# Patient Record
Sex: FEMALE | Race: AMERICAN INDIAN OR ALASKA NATIVE
[De-identification: names, ages, dates, MRNs, and addresses within clinical notes are randomized per-mention and may not be internally consistent; named-entity substitution may affect disease eponyms.]

---

## 2018-06-19 NOTE — ULTRASOUND REPORT
FINAL REPORT



EXAM:  US OB FETAL BPP WO NON-STRESS



HISTORY:  EFW, Position 



TECHNIQUE:  Ultrasound examination of the gravid uterus for

biophysical profile evaluation of the fetus 



PRIORS:  Ob ultrasound 6/19/2018



FINDINGS:  

There is a single viable intrauterine pregnancy with documented

cardiac activity. The amniotic fluid volume is normal.



Fetal heart rate:  163 bpm



Amniotic fluid maximum vertical pocket: 4.8 cm



Evaluation for biophysical profile yields the following score as

reported by technologist from real-time exam:



Fetal respiratory motion (minimum one episode):   2 



Gross body movement (minimum 3 movements):  2 



Fetal tone (minimum one flexion and extension):  2 



Amniotic fluid volume (at least 2 cm pocket in vertical

diameter): 2 



IMPRESSION:  

Single viable intrauterine pregnancy with 8/8 biophysical profile

score during the sonographic evaluation

## 2018-06-19 NOTE — PROCEDURE NOTE
OB Delivery Note





- Delivery


Date of Delivery: 18


Surgeon: NIKOLAI SMITH


Estimated blood loss: 200cc





- Vaginal


Delivery presentation: vertex


Delivery position: OA


Intrapartum events:  labor-<37 weeks, precipitous labor- <3hr


Delivery induction: none


Delivery augmentation: rupture of membranes


Delivery monitor: external FHT, external uterine


Route of delivery: 


Delivery placenta: spontaneous


Delivery cord: 3 umbilical vessels


Episiotomy: none


Delivery laceration: none


Anesthesia: none


Delivery comments: 





Infant delivered OA and handed to awaiting Peds/RT in attendance





- Infant


  ** A


Apgar at 1 minute: 4


Apgar at 5 minutes: 8


Infant Gender: Male (1730gms)

## 2018-06-19 NOTE — ULTRASOUND REPORT
FINAL REPORT



EXAM:  US OB &gt; = 14 WEEKS FETUS



HISTORY:  fetal anatomy, position, EFW 



TECHNIQUE:   Ultrasound evaluation of the gravid uterus 



PRIORS:  Biophysical profile 6/19/2018



FINDINGS:  

There is a single viable intrauterine pregnancy with documented

cardiac activity.  Multiple ultrasound measurements are made to

determine a composite gestational age. Fetal ratios are within

normal limits. 



There is no evidence of placenta previa or abruption.  The

maternal cervix appears open and/or is obscured by fetal head.

The quantity of visualized amniotic fluid appears grossly normal.

 No sonographic abnormality in the visualized portion of the

fetal anatomy.



Heart rate:  163 beats per minute



Fetal position:  Cephalic



Placental position:  Left lateral, grade 1



Amniotic fluid index:  16.3cm



Estimated fetal weight: 1748 g



Growth percentile by ultrasound:  65



Ultrasound estimated gestational age: 31 weeks 1 day



Ultrasound estimated delivery date: 8/20/2018



LMP estimated gestational age: 30 weeks 4 days



LMP estimated delivery date: 8/24/2018



IMPRESSION:  

Single viable intrauterine pregnancy with the above parameters



The maternal cervix appears open and/or is obscured by the fetal

head

## 2018-06-19 NOTE — HISTORY AND PHYSICAL REPORT
History of Present Illness


Date of examination: 18


Chief complaint: 





 labor


History of present illness: 





Pt is a 24yo BF  EDC 18;  EGA 31 3/7 weeks presents to L&D complaining 

of RUC's q 2-3 mins.  She received prenatal care at [facility] 

since 13 weeks and  course significant for previous C section and 

untreated UTI from last week - Pt did not fill the prescription. She is 

currently dilated 6cm with a BBOW, and therefore will be admitted for 

aggressive attempt at tocolysis and administration of steroids.  Prenatal 

records are available.





Past History


Past Medical History: no pertinent history


Past Surgical History:  section


Social history: no significant social history, single





- Obstetrical History


Expected Date of Delivery: 18


Actual Gestation: 31 Week(s) 3 Day(s) 


: 2





Medications and Allergies


 Allergies











Allergy/AdvReac Type Severity Reaction Status Date / Time


 


No Known Allergies Allergy   Unverified 18 10:28











 Home Medications











 Medication  Instructions  Recorded  Confirmed  Last Taken  Type


 


Ferrous Sulfate [Feosol 325 MG tab] 1 tab PO QDAY 18 19:

00 History


 


Pnv No.95/Ferrous Fum/Folic AC 1 tab PO QDAY 18 19:00 

History





[Prenatal Vitamins Tablet]     


 


Nitrofurantoin Macrocrystal 100 mg PO BID #14 capsule 06/15/18 06/19/18 Unknown 

Rx





[Macrodantin]     











Active Meds: 


Active Medications





Betamethasone Acet/Betameth SodPhos (Celestone Soluspan)  12 mg IM Q12H JULIA


Lactated Ringer's (Lactated Ringers)  1,000 mls @ 125 mls/hr IV AS DIRECT JULIA


   Last Admin: 18 15:20 Dose:  125 mls/hr


Cefazolin Sodium (Ancef/Sterile Water 2 Gm/20 Ml)  2 gm in 20 mls @ 120 mls/hr 

IV ONCE NR


   Stop: 18 18:00


Ampicillin Sodium (Polycillin/Ns 2 Gm/100 Ml)  2 gm in 100 mls @ 100 mls/hr IV 

ONCE ONE; Protocol


   Stop: 18 16:59


Magnesium Sulfate (Magnesium Sulfate 40gm/1000ml)  40 gm in 1,000 mls @ 50 mls/

hr IV AS DIRECT JULIA


Magnesium Sulfate (Magnesium Sulfate 4gm/100ml)  4 gm in 100 mls @ 300 mls/hr 

IV ONCE ONE


   Stop: 18 15:51


Terbutaline Sulfate (Brethine)  0.25 mg SUB-Q ONCE NR


   Stop: 18 19:00


   Last Admin: 18 15:12 Dose:  0.25 mg


Terbutaline Sulfate (Brethine)  0.25 mg SUB-Q ONCE PRN


   PRN Reason: Hyperstimulation/Hypertonicity











Review of Systems


All systems: negative





- Vital Signs


Vital signs: 


 Vital Signs











Pulse BP Pulse Ox


 


 111 H  110/56   98 


 


 18 12:56  18 12:56  18 12:56








 











Temp Pulse Resp BP Pulse Ox


 


 98.0 F   98 H  16   110/56   100 


 


 18 13:23  18 15:41  18 13:23  18 12:56  18 15:41














- Physical Exam


Breasts: Positive: deferred


Cardiovascular: Regular rate


Lungs: Positive: Clear to auscultation


Abdomen: Positive: normal appearance


Genitourinary (Female): Positive: normal external genitalia


Uterus: Positive: enlarged


Extremities: Positive: normal





- Obstetrical


FHR: category 1


Uterine Contraction Monitor Mode: External


Cervical Dilatation: 6 (per nurse)


Cervical Effacement Percentage: 100 (per nurse)


Fetal station: -2


Uterine Contraction Pattern: Regular


Uterine Tone Measurement Phase: Contraction


Uterine Contraction Intensity: Moderate





Results


Result Diagrams: 


 18 17:44





 Abnormal lab results











  18 Range/Units





  14:10 


 


Urine WBC (Auto)  134.0 H  (0.0-6.0)  /HPF








All other labs normal.





Ultrasound: report reviewed





Assessment and Plan





- Patient Problems


(1) 31 weeks gestation of pregnancy


Onset Date: 18   Current Visit: Yes   Status: Acute   


Plan to address problem: 


A:  IUP @ 31 3/7 weeks


       labor


      Previous C Section





 P:  Admit to L&D for Observation


      Begin IV fluids, IV Magnesium sulfate, IV ampicillin, IM steroids


      Obtain NICU consult


      Ob u/s for growth, position, EFW and EDGARDO








(2)  contractions


Onset Date: 18   Current Visit: Yes   Status: Acute

## 2018-06-20 NOTE — PROGRESS NOTE
Assessment and Plan





- Patient Problems


(1) 31 weeks gestation of pregnancy


Onset Date: 18   Current Visit: Yes   Status: Ruled-out   





(2)  contractions


Onset Date: 18   Current Visit: Yes   Status: Ruled-out   





(3)  (normal spontaneous vaginal delivery)


Onset Date: 18   Current Visit: Yes   Status: Resolved   





(4)  (vaginal birth after )


Onset Date: 18   Current Visit: Yes   Status: Resolved   


Plan to address problem: 


A:  S/P  () - PPd #1 Doing well


      Asymptomatic anemia - stable





 P:  May go home today.








Subjective





- Subjective


Date of service: 18


Principal diagnosis: s/p  - PPD #1


Interval history: 





Pt is feeling well without complaints.  Bleeding improved.  Baby in NICU.


Patient reports: appetite normal, voiding normally, pain well controlled, flatus

, ambulating normally, no dizzy ambulation, no nauseated


[place]: doing well, in NICU





Objective





- Vital Signs


Latest vital signs: 


 Vital Signs











  Temp Pulse Resp BP BP Pulse Ox


 


 18 01:37  98.3 F  76  20  106/62   97


 


 18 20:45  98.4 F  95 H  20   117/61  100


 


 18 19:05   103 H   113/68  


 


 18 18:50   102 H   106/56  


 


 18 18:42   102 H     100


 


 18 18:39   103 H   92/54  


 


 18 18:37   97 H     100


 


 18 18:35   109 H   87/63  


 


 18 18:32   104 H     100


 


 18 18:27   109 H     100


 


 18 18:22   101 H     100


 


 18 18:20   104 H   98/59  


 


 18 18:17   106 H     100


 


 18 18:12   104 H     100


 


 18 18:07   109 H     100


 


 18 18:05   102 H   94/56  


 


 18 18:02   100 H     100


 


 18 17:57   112 H     100


 


 18 17:52   113 H     100


 


 18 17:46   117 H     100


 


 18 17:44  98.5 F  120 H  16  101/53  101/53 


 


 06/19/18 17:41   120 H     100


 


 06/19/18 17:37   120 H   94/55  


 


 06/19/18 17:36   122 H     100


 


 06/19/18 17:34  98.5 F  120 H  16   94/55 


 


 06/19/18 17:31   114 H     93


 


 06/19/18 17:26   119 H     99


 


 06/19/18 17:25   115 H   115/59  


 


 06/19/18 17:21   118 H     97


 


 06/19/18 17:16   104 H     99


 


 06/19/18 17:14   109 H   102/58  


 


 06/19/18 17:11   101 H     100


 


 06/19/18 17:06   104 H     100


 


 06/19/18 17:04   106 H   109/64  


 


 06/19/18 17:01   115 H     100


 


 06/19/18 16:56   99 H     100


 


 06/19/18 16:54   110 H   99/55  


 


 06/19/18 16:51   110 H     98


 


 06/19/18 16:46   103 H     99


 


 06/19/18 16:44   105 H   101/57  


 


 0619/18 16:41   109 H     98


 


 06/19/18 16:36   101 H     100


 


 06/19/18 16:34   110 H   87/52  


 


 06/19/18 16:33   111 H   97/49  


 


 06/19/18 16:28  98.5 F  111 H  16   104/63 


 


 06/19/18 16:25   111 H   90/51  


 


 06/19/18 16:09   115 H   135/63  


 


 06/19/18 15:51   116 H     100


 


 06/19/18 15:46   108 H     100


 


 06/19/18 15:41   98 H     100


 


 06/19/18 15:38   103 H     88


 


 06/19/18 15:36   104 H     100


 


 06/19/18 15:31   111 H     100


 


 06/19/18 15:26   97 H     96


 


 06/19/18 15:22   94 H     92


 


 06/19/18 15:21   91 H     100


 


 06/19/18 15:13   102 H     99


 


 06/19/18 15:11   98 H     94


 


 06/19/18 15:08   95 H     98


 


 06/19/18 15:04   100 H     91


 


 06/19/18 15:03   102 H     99


 


 06/19/18 14:58   99 H     93


 


 06/19/18 14:53   93 H     99


 


 06/19/18 13:23  98.0 F   16   


 


 18 13:06   103 H     99


 


 18 13:01   106 H     99


 


 18 12:56   111 H   110/56   98








 Intake and Output











 18





 22:59 06:59 14:59


 


Intake Total 500 240 


 


Output Total 1500 900 


 


Balance -1000 -660 


 


Intake:   


 


    


 


    Lactated Ringers 500 ml @ 500  





    999 mls/hr IV BOLUS ONE   





    Rx#:[id number]   


 


  Oral  240 


 


Output:   


 


  Urine 1500 900 


 


    Void 1500 900 


 


Other:   


 


  Total, Intake Amount  240 


 


  Total, Output Amount 500 900 


 


  # Voids   


 


    Void 1  


 


  Estimated Blood Loss 200  














- Exam


Breasts: Present: deferred


Cardiovascular: Present: Regular rate


Lungs: Present: Clear to auscultation


Abdomen: Present: normal appearance, soft


Uterus: Present: normal, firm, fundal height below umbilicus


Extremities: Present: normal





- Labs


Labs: 


 Abnormal lab results











  18 Range/Units





  14:10 17:44 


 


WBC   14.4 H  (4.5-11.0)  K/mm3


 


RBC   3.32 L  (3.65-5.03)  M/mm3


 


Hgb   8.7 L  (10.1-14.3)  gm/dl


 


Hct   27.6 L  (30.3-42.9)  %


 


MCH   26 L  (28-32)  pg


 


RDW   15.3 H  (13.2-15.2)  %


 


Seg Neuts % (Manual)   92.0 H  (40.0-70.0)  %


 


Lymphocytes % (Manual)   3.0 L  (13.4-35.0)  %


 


Seg Neutrophils # Man   13.2 H  (1.8-7.7)  K/mm3


 


Lymphocytes # (Manual)   0.4 L  (1.2-5.4)  K/mm3


 


Urine WBC (Auto)  134.0 H   (0.0-6.0)  /HPF








 Laboratory Tests











  18





  14:10 15:00 17:44


 


WBC   


 


RBC   


 


Hgb   


 


Hct   


 


MCV   


 


MCH   


 


MCHC   


 


RDW   


 


Plt Count   


 


Add Manual Diff   


 


Total Counted   


 


Seg Neutrophils %   


 


Seg Neuts % (Manual)   


 


Band Neutrophils %   


 


Lymphocytes % (Manual)   


 


Reactive Lymphs % (Man)   


 


Monocytes % (Manual)   


 


Eosinophils % (Manual)   


 


Basophils % (Manual)   


 


Metamyelocytes %   


 


Myelocytes %   


 


Promyelocytes %   


 


Blast Cells %   


 


Nucleated RBC %   


 


Seg Neutrophils # Man   


 


Band Neutrophils #   


 


Lymphocytes # (Manual)   


 


Abs React Lymphs (Man)   


 


Monocytes # (Manual)   


 


Eosinophils # (Manual)   


 


Basophils # (Manual)   


 


Metamyelocytes #   


 


Myelocytes #   


 


Promyelocytes #   


 


Blast Cells #   


 


WBC Morphology   


 


Hypersegmented Neuts   


 


Hyposegmented Neuts   


 


Hypogranular Neuts   


 


Smudge Cells   


 


Toxic Granulation   


 


Toxic Vacuolation   


 


Dohle Bodies   


 


Pelger-Huet Anomaly   


 


Wilfrido Rods   


 


Platelet Estimate   


 


Clumped Platelets   


 


Plt Clumps, EDTA   


 


Large Platelets   


 


Giant Platelets   


 


Platelet Satelliting   


 


Plt Morphology Comment   


 


RBC Morphology   


 


Dimorphic RBCs   


 


Polychromasia   


 


Hypochromasia   


 


Poikilocytosis   


 


Anisocytosis   


 


Microcytosis   


 


Macrocytosis   


 


Spherocytes   


 


Pappenheimer Bodies   


 


Sickle Cells   


 


Target Cells   


 


Tear Drop Cells   


 


Ovalocytes   


 


Helmet Cells   


 


[place] Bodies   


 


Cabot Rings   


 


[place] Cells   


 


Bite Cells   


 


Crenated Cell   


 


Elliptocytes   


 


Acanthocytes (Spur)   


 


Rouleaux   


 


Hemoglobin C Crystals   


 


Schistocytes   


 


Malaria parasites   


 


Luis Bodies   


 


Hem Pathologist Commnt   


 


Urine Color  Yellow  


 


Urine Turbidity  Clear  


 


Urine pH  6.0  


 


Ur Specific Gravity  1.014  


 


Urine Protein  <15 mg/dl  


 


Urine Glucose (UA)  50  


 


Urine Ketones  Neg  


 


Urine Blood  Sm  


 


Urine Nitrite  Neg  


 


Urine Bilirubin  Neg  


 


Urine Urobilinogen  < 2.0  


 


Ur Leukocyte Esterase  Lg  


 


Urine WBC (Auto)  134.0 H  


 


Urine RBC (Auto)  12.0  


 


U Epithel Cells (Auto)  11.0  


 


Urine Mucus  Few  


 


Fetal Fibronectin   Positive 


 


Blood Type    O POSITIVE


 


Antibody Screen    Negative














  18





  17:44 07:53


 


WBC  14.4 H 


 


RBC  3.32 L 


 


Hgb  8.7 L  8.6 L


 


Hct  27.6 L  27.7 L


 


MCV  83 


 


MCH  26 L 


 


MCHC  32 


 


RDW  15.3 H 


 


Plt Count  277 


 


Add Manual Diff  Complete 


 


Total Counted  100 


 


Seg Neutrophils %  Np 


 


Seg Neuts % (Manual)  92.0 H 


 


Band Neutrophils %  2.0 


 


Lymphocytes % (Manual)  3.0 L 


 


Reactive Lymphs % (Man)  0 


 


Monocytes % (Manual)  3.0 


 


Eosinophils % (Manual)  0 


 


Basophils % (Manual)  0 


 


Metamyelocytes %  0 


 


Myelocytes %  0 


 


Promyelocytes %  0 


 


Blast Cells %  0 


 


Nucleated RBC %  Not Reportable 


 


Seg Neutrophils # Man  13.2 H 


 


Band Neutrophils #  0.3 


 


Lymphocytes # (Manual)  0.4 L 


 


Abs React Lymphs (Man)  0.0 


 


Monocytes # (Manual)  0.4 


 


Eosinophils # (Manual)  0.0 


 


Basophils # (Manual)  0.0 


 


Metamyelocytes #  0.0 


 


Myelocytes #  0.0 


 


Promyelocytes #  0.0 


 


Blast Cells #  0.0 


 


WBC Morphology  Not Reportable 


 


Hypersegmented Neuts  Not Reportable 


 


Hyposegmented Neuts  Not Reportable 


 


Hypogranular Neuts  Not Reportable 


 


Smudge Cells  Not Reportable 


 


Toxic Granulation  Not Reportable 


 


Toxic Vacuolation  Not Reportable 


 


Dohle Bodies  Not Reportable 


 


Pelger-Huet Anomaly  Not Reportable 


 


Wilfrido Rods  Not Reportable 


 


Platelet Estimate  Consistent w auto 


 


Clumped Platelets  Not Reportable 


 


Plt Clumps, EDTA  Not Reportable 


 


Large Platelets  Not Reportable 


 


Giant Platelets  Not Reportable 


 


Platelet Satelliting  Not Reportable 


 


Plt Morphology Comment  Not Reportable 


 


RBC Morphology  Normal 


 


Dimorphic RBCs  Not Reportable 


 


Polychromasia  Not Reportable 


 


Hypochromasia  Not Reportable 


 


Poikilocytosis  Not Reportable 


 


Anisocytosis  Not Reportable 


 


Microcytosis  Not Reportable 


 


Macrocytosis  Not Reportable 


 


Spherocytes  Not Reportable 


 


Pappenheimer Bodies  Not Reportable 


 


Sickle Cells  Not Reportable 


 


Target Cells  Not Reportable 


 


Tear Drop Cells  Not Reportable 


 


Ovalocytes  Not Reportable 


 


Helmet Cells  Not Reportable 


 


[place] Bodies  Not Reportable 


 


Cabot Rings  Not Reportable 


 


[place] Cells  Not Reportable 


 


Bite Cells  Not Reportable 


 


Crenated Cell  Not Reportable 


 


Elliptocytes  Not Reportable 


 


Acanthocytes (Spur)  Not Reportable 


 


Rouleaux  Not Reportable 


 


Hemoglobin C Crystals  Not Reportable 


 


Schistocytes  Not Reportable 


 


Malaria parasites  Not Reportable 


 


Luis Bodies  Not Reportable 


 


Hem Pathologist Commnt  No 


 


Urine Color  


 


Urine Turbidity  


 


Urine pH  


 


Ur Specific Gravity  


 


Urine Protein  


 


Urine Glucose (UA)  


 


Urine Ketones  


 


Urine Blood  


 


Urine Nitrite  


 


Urine Bilirubin  


 


Urine Urobilinogen  


 


Ur Leukocyte Esterase  


 


Urine WBC (Auto)  


 


Urine RBC (Auto)  


 


U Epithel Cells (Auto)  


 


Urine Mucus  


 


Fetal Fibronectin  


 


Blood Type  


 


Antibody Screen

## 2018-06-20 NOTE — DISCHARGE SUMMARY
Providers





- Providers


Date of Admission: 


18 15:47





Date of discharge: 18


Attending physician: 


NIKOLAI SMITH





Primary care physician: 


NIKOLAI SMITH








Hospitalization


Reason for admission: active labor, IUP - ,  labor, rupture of 

membranes


Delivery: , 


Episiotomy: none


Laceration: none


Other postpartum procedures: none


Postpartum complications: none


Discharge diagnosis:  delivery


[place] baby: male


Hospital course: 





Unremarkable.


Condition at discharge: Good


Disposition: DC-01 TO HOME OR SELFCARE





- Discharge Diagnoses


(1) 31 weeks gestation of pregnancy


Status: Ruled-out   





(2)  contractions


Status: Ruled-out   





(3)  (normal spontaneous vaginal delivery)


Status: Resolved   





(4)  (vaginal birth after )


Status: Resolved   





Plan





- Discharge Medications


Prescriptions: 


Ferrous Sulfate [Feosol 325 MG tab] 325 mg PO BID #60 tablet


Ibuprofen [Motrin 600 MG tab] 600 mg PO Q6H #30 tablet


Prenatal Vit-Fe Fumar-FA [Prenatal Vitamin] 1 each PO QDAY #30 tablet





- Provider Discharge Summary


Activity: routine, no sex for 6 weeks, no heavy lifting 4 weeks, no strenuous 

exercise


Diet: routine


Instructions: routine


Additional instructions: 


[]  Smoking cessation referral if applicable(refer to patient education folder 

for contact #)


[]  Refer to [facility] Booklet








Call your doctor immediately for:


* Fever > 100.5


* Heavy vaginal bleeding ( >1 pad per hour)


* Severe persistent headache


* Shortness of breath


* Reddened, hot, painful area to leg or breast


* Drainage or odor from incision.





* Keep incision clean and dry at all times and follow doctor's instructions 

regarding bathing/showering











- Follow up plan


Follow up: 


NIKOLAI SMITH MD [Primary Care Provider] - 6 Weeks


DARVIN FRANCOIS NP [Referring] - 6 Weeks

## 2018-10-09 ENCOUNTER — HOSPITAL ENCOUNTER (EMERGENCY)
Dept: HOSPITAL 5 - ED | Age: 23
Discharge: LEFT BEFORE DISCHARGE | End: 2018-10-09
Payer: COMMERCIAL

## 2018-10-09 VITALS — DIASTOLIC BLOOD PRESSURE: 66 MMHG | SYSTOLIC BLOOD PRESSURE: 116 MMHG

## 2018-10-09 DIAGNOSIS — Z53.21: ICD-10-CM

## 2018-10-09 DIAGNOSIS — N93.9: Primary | ICD-10-CM

## 2018-10-09 LAB
BASOPHILS # (AUTO): 0 K/MM3 (ref 0–0.1)
BASOPHILS NFR BLD AUTO: 0.1 % (ref 0–1.8)
BILIRUB UR QL STRIP: (no result)
BLOOD UR QL VISUAL: (no result)
EOSINOPHIL # BLD AUTO: 0.1 K/MM3 (ref 0–0.4)
EOSINOPHIL NFR BLD AUTO: 0.9 % (ref 0–4.3)
HCT VFR BLD CALC: 32.2 % (ref 30.3–42.9)
HGB BLD-MCNC: 10.4 GM/DL (ref 10.1–14.3)
LYMPHOCYTES # BLD AUTO: 2.9 K/MM3 (ref 1.2–5.4)
LYMPHOCYTES NFR BLD AUTO: 25.2 % (ref 13.4–35)
MCH RBC QN AUTO: 27 PG (ref 28–32)
MCHC RBC AUTO-ENTMCNC: 32 % (ref 30–34)
MCV RBC AUTO: 82 FL (ref 79–97)
MONOCYTES # (AUTO): 0.8 K/MM3 (ref 0–0.8)
MONOCYTES % (AUTO): 7.1 % (ref 0–7.3)
MUCOUS THREADS #/AREA URNS HPF: (no result) /HPF
PH UR STRIP: 5 [PH] (ref 5–7)
PLATELET # BLD: 424 K/MM3 (ref 140–440)
PROT UR STRIP-MCNC: (no result) MG/DL
RBC # BLD AUTO: 3.91 M/MM3 (ref 3.65–5.03)
RBC #/AREA URNS HPF: 88 /HPF (ref 0–6)
UROBILINOGEN UR-MCNC: < 2 MG/DL (ref ?–2)
WBC #/AREA URNS HPF: 1 /HPF (ref 0–6)

## 2018-10-09 PROCEDURE — 85025 COMPLETE CBC W/AUTO DIFF WBC: CPT

## 2018-10-09 PROCEDURE — 36415 COLL VENOUS BLD VENIPUNCTURE: CPT

## 2018-10-09 PROCEDURE — 86901 BLOOD TYPING SEROLOGIC RH(D): CPT

## 2018-10-09 PROCEDURE — 86850 RBC ANTIBODY SCREEN: CPT

## 2018-10-09 PROCEDURE — 81001 URINALYSIS AUTO W/SCOPE: CPT

## 2018-10-09 PROCEDURE — 86900 BLOOD TYPING SEROLOGIC ABO: CPT

## 2019-02-17 ENCOUNTER — HOSPITAL ENCOUNTER (EMERGENCY)
Dept: HOSPITAL 5 - ED | Age: 24
Discharge: HOME | End: 2019-02-17
Payer: COMMERCIAL

## 2019-02-17 VITALS — SYSTOLIC BLOOD PRESSURE: 112 MMHG | DIASTOLIC BLOOD PRESSURE: 66 MMHG

## 2019-02-17 DIAGNOSIS — Z3A.01: ICD-10-CM

## 2019-02-17 DIAGNOSIS — O20.0: Primary | ICD-10-CM

## 2019-02-17 LAB
BACTERIA #/AREA URNS HPF: (no result) /HPF
BASOPHILS # (AUTO): 0.1 K/MM3 (ref 0–0.1)
BASOPHILS NFR BLD AUTO: 0.6 % (ref 0–1.8)
BILIRUB UR QL STRIP: (no result)
BLOOD UR QL VISUAL: (no result)
EOSINOPHIL # BLD AUTO: 0.1 K/MM3 (ref 0–0.4)
EOSINOPHIL NFR BLD AUTO: 0.6 % (ref 0–4.3)
HCT VFR BLD CALC: 34.6 % (ref 30.3–42.9)
HGB BLD-MCNC: 10.7 GM/DL (ref 10.1–14.3)
LYMPHOCYTES # BLD AUTO: 2.4 K/MM3 (ref 1.2–5.4)
LYMPHOCYTES NFR BLD AUTO: 26.1 % (ref 13.4–35)
MCHC RBC AUTO-ENTMCNC: 31 % (ref 30–34)
MCV RBC AUTO: 79 FL (ref 79–97)
MONOCYTES # (AUTO): 0.7 K/MM3 (ref 0–0.8)
MONOCYTES % (AUTO): 7.8 % (ref 0–7.3)
MUCOUS THREADS #/AREA URNS HPF: (no result) /HPF
PH UR STRIP: 7 [PH] (ref 5–7)
PLATELET # BLD: 389 K/MM3 (ref 140–440)
PROT UR STRIP-MCNC: (no result) MG/DL
RBC # BLD AUTO: 4.4 M/MM3 (ref 3.65–5.03)
RBC #/AREA URNS HPF: 5 /HPF (ref 0–6)
UROBILINOGEN UR-MCNC: < 2 MG/DL (ref ?–2)
WBC #/AREA URNS HPF: 1 /HPF (ref 0–6)

## 2019-02-17 PROCEDURE — 84702 CHORIONIC GONADOTROPIN TEST: CPT

## 2019-02-17 PROCEDURE — 76801 OB US < 14 WKS SINGLE FETUS: CPT

## 2019-02-17 PROCEDURE — 36415 COLL VENOUS BLD VENIPUNCTURE: CPT

## 2019-02-17 PROCEDURE — 99284 EMERGENCY DEPT VISIT MOD MDM: CPT

## 2019-02-17 PROCEDURE — 81001 URINALYSIS AUTO W/SCOPE: CPT

## 2019-02-17 PROCEDURE — 86850 RBC ANTIBODY SCREEN: CPT

## 2019-02-17 PROCEDURE — 85025 COMPLETE CBC W/AUTO DIFF WBC: CPT

## 2019-02-17 PROCEDURE — 86900 BLOOD TYPING SEROLOGIC ABO: CPT

## 2019-02-17 PROCEDURE — 76817 TRANSVAGINAL US OBSTETRIC: CPT

## 2019-02-17 PROCEDURE — 86901 BLOOD TYPING SEROLOGIC RH(D): CPT

## 2019-02-17 NOTE — ULTRASOUND REPORT
FINAL REPORT



PROCEDURE:  US OB &lt; = 14 WEEKS FETUS



TECHNIQUE:  Real-time transabdominal and transvaginal sonography of the uterus, placenta, amniotic fl
uid, adnexa, and fetus was performed with image documentation. Measurements were obtained to determin
e fetal age/size. M-mode Doppler was used to document fetal heartbeat. CPT 41258 and 79308 



HISTORY:  vaginal bleeding 



COMPARISON:  No prior studies are available for comparison.



FINDINGS:  

GESTATION: Single.



CRL: 9 mm, which corresponds to a gestational age of: 6 weeks, 6 days. 



Yolk Sac: Normal.



Embryonic Cardiac Activity: 125 beats per minute



Gestational Sac: There is adjacent hypoechoic region consistent with subchorionic hemorrhage



Amniotic fluid: Normal.



Cervix: Normal.



Right Ovary: 2.5 x 2.2 x 2.2 cm



Left Ovary: 2.9 x 2.7 x 2.0 cm



Estimated delivery date: October 7, 2019



Uterus and adnexa: Normal.



IMPRESSION:  

1. Single live intrauterine gestation at approximately 6 weeks, 6 days.



2.  EDC by US October 7, 2019

## 2019-02-17 NOTE — ULTRASOUND REPORT
FINAL REPORT



PROCEDURE:  US OB &lt; = 14 WEEKS FETUS



TECHNIQUE:  Real-time transabdominal and transvaginal sonography of the uterus, placenta, amniotic fl
uid, adnexa, and fetus was performed with image documentation. Measurements were obtained to determin
e fetal age/size. M-mode Doppler was used to document fetal heartbeat. CPT 64533 and 81646 



HISTORY:  vaginal bleeding 



COMPARISON:  No prior studies are available for comparison.



FINDINGS:  

GESTATION: Single.



CRL: 9 mm, which corresponds to a gestational age of: 6 weeks, 6 days. 



Yolk Sac: Normal.



Embryonic Cardiac Activity: 125 beats per minute



Gestational Sac: There is adjacent hypoechoic region consistent with subchorionic hemorrhage



Amniotic fluid: Normal.



Cervix: Normal.



Right Ovary: 2.5 x 2.2 x 2.2 cm



Left Ovary: 2.9 x 2.7 x 2.0 cm



Estimated delivery date: October 7, 2019



Uterus and adnexa: Normal.



IMPRESSION:  

1. Single live intrauterine gestation at approximately 6 weeks, 6 days.



2.  EDC by US October 7, 2019

## 2019-02-17 NOTE — EMERGENCY DEPARTMENT REPORT
Blank Doc





- Documentation


Documentation: 





This is a 23-year-old female  presents to the ED with vaginal bleeding x2 

days.  Stated changes about 3 pads a day.  Denies abdominal or pelvic pain.  

Denies f/o with OB.





This initial assessment diagnostic orders/clinical plan/treatment(s) is/are 

subject to change based on patient's health status, clinical progression and re-

assessment by fellow clinical providers in the ED.  Further treatment and workup

at subsequent clinical providers discretion.  Patient/guardians urged not to 

elope from ED s their condition may be serious if not clinically assessed and 

managed.  Initial orders include:


1-patient sent to [facility] for further evaluation and treatment.


2- UA


3- Labs

## 2019-02-17 NOTE — EMERGENCY DEPARTMENT REPORT
ED Female  HPI





- General


Chief complaint: Vaginal Bleeding


Stated complaint: PREG/VAGINAL BLEEDING


Time Seen by Provider: 19 17:41


Source: patient


Mode of arrival: Ambulatory


Limitations: No Limitations





- History of Present Illness


Initial comments: 





Jerardo-year-old American female with no significant past medical history 

reports she is pregnant.  She is not sure if all long bones been having some 

bleeding and cramping sensation for the last 2 days.  The bleeding is heavier in

the morning, visas throughout the course of the day.  The cramping is very mild.

 She has no pain at current.  She reports no fever, chills, sweats, chest pain, 

palpitations, vomiting, constipation, nausea, diarrhea.  No hemoptysis or 

hematemesis


MD Complaint: vaginal bleeding


-: Gradual





- Related Data


                                Home Medications











 Medication  Instructions  Recorded  Confirmed  Last Taken


 


Ferrous Sulfate [Feosol 325 MG tab] 1 tab PO QDAY 18 

19:00


 


Pnv No.95/Ferrous Fum/Folic AC 1 tab PO QDAY 18 19:00





[Prenatal Vitamins Tablet]    








                                  Previous Rx's











 Medication  Instructions  Recorded  Last Taken  Type


 


Nitrofurantoin Macrocrystal 100 mg PO BID #14 capsule 06/15/18 Unknown Rx





[Macrodantin]    


 


Ferrous Sulfate [Feosol 325 MG tab] 325 mg PO BID #60 tablet 18 Unknown Rx


 


Ibuprofen [Motrin 600 MG tab] 600 mg PO Q6H #30 tablet 18 Unknown Rx


 


Prenatal Vit-Fe Fumar-FA [Prenatal 1 each PO QDAY #30 tablet 18 Unknown Rx





Vitamin]    











                                    Allergies











Allergy/AdvReac Type Severity Reaction Status Date / Time


 


No Known Allergies Allergy   Verified 10/09/18 03:27














ED Review of Systems


ROS: 


Stated complaint: PREG/VAGINAL BLEEDING


Other details as noted in HPI





Constitutional: denies: chills, fever


Eyes: denies: eye pain, eye discharge, vision change


ENT: denies: ear pain, throat pain


Respiratory: denies: cough, shortness of breath, wheezing


Cardiovascular: denies: chest pain, palpitations


Endocrine: no symptoms reported


Gastrointestinal: denies: abdominal pain, nausea, diarrhea


Genitourinary: denies: urgency, dysuria, discharge


Musculoskeletal: denies: back pain, joint swelling, arthralgia


Skin: denies: rash, lesions


Neurological: denies: headache, weakness, paresthesias


Psychiatric: denies: anxiety, depression


Hematological/Lymphatic: denies: easy bleeding, easy bruising





ED Past Medical Hx





- Past Medical History


Previous Medical History?: Yes


Hx Hypertension: No


Hx Diabetes: No


Hx Deep Vein Thrombosis: No


Hx Renal Disease: No


Hx Sickle Cell Disease: No


Hx Seizures: No


Hx Asthma: No


Hx COPD: No


Hx HIV: No


Additional medical history: Vaginal delivery





- Surgical History


Past Surgical History?: Yes


Additional Surgical History: .  





- Social History


Smoking Status: Never Smoker


Substance Use Type: None





- Medications


Home Medications: 


                                Home Medications











 Medication  Instructions  Recorded  Confirmed  Last Taken  Type


 


Ferrous Sulfate [Feosol 325 MG tab] 1 tab PO QDAY 18 

19:00 History


 


Pnv No.95/Ferrous Fum/Folic AC 1 tab PO QDAY 18 19:00 

History





[Prenatal Vitamins Tablet]     


 


Nitrofurantoin Macrocrystal 100 mg PO BID #14 capsule 06/15/18 06/19/18 Unknown 

Rx





[Macrodantin]     


 


Ferrous Sulfate [Feosol 325 MG tab] 325 mg PO BID #60 tablet 18  Unknown 

Rx


 


Ibuprofen [Motrin 600 MG tab] 600 mg PO Q6H #30 tablet 18  Unknown Rx


 


Prenatal Vit-Fe Fumar-FA [Prenatal 1 each PO QDAY #30 tablet 18  Unknown 

Rx





Vitamin]     














ED Physical Exam





- General


Limitations: No Limitations


General appearance: alert, in no apparent distress





- Head


Head exam: Present: atraumatic, normocephalic





- Eye


Eye exam: Present: normal appearance, PERRL, EOMI


Pupils: Present: normal accommodation





- ENT


ENT exam: Present: normal exam, mucous membranes moist





- Neck


Neck exam: Present: normal inspection, full ROM





- Respiratory


Respiratory exam: Present: normal lung sounds bilaterally.  Absent: respiratory 

distress, wheezes, rales, rhonchi, chest wall tenderness, accessory muscle use, 

decreased breath sounds





- Cardiovascular


Cardiovascular Exam: Present: regular rate, normal rhythm.  Absent: bradycardia,

tachycardia, systolic murmur, diastolic murmur, rubs, gallop





- GI/Abdominal


GI/Abdominal exam: Present: soft, normal bowel sounds.  Absent: distended, 

tenderness, guarding, rebound, hyperactive bowel sounds, organomegaly, mass, 

bruit, pulsatile mass





- Extremities Exam


Extremities exam: Present: normal inspection, full ROM, normal capillary refill.

 Absent: calf tenderness





- Back Exam


Back exam: Present: normal inspection, full ROM.  Absent: tenderness, CVA 

tenderness (R), CVA tenderness (L), muscle spasm





- Neurological Exam


Neurological exam: Present: alert, oriented X3, CN II-XII intact, normal gait, 

motor sensory deficit.  Absent: abnormal gait, reflexes normal





- Psychiatric


Psychiatric exam: Present: normal affect, normal mood.  Absent: anxious, flat 

affect, manic





- Skin


Skin exam: Present: warm, dry, intact, normal color.  Absent: rash





ED Course





                                   Vital Signs











  19





  17:42


 


Temperature 97.9 F


 


Pulse Rate 84


 


Respiratory 18





Rate 


 


Blood Pressure 122/61


 


O2 Sat by Pulse 100





Oximetry 














ED Medical Decision Making





- Lab Data


Result diagrams: 


                                 19 18:18








- Radiology Data


Radiology results: report reviewed (ultrasound shows a fetus is 6 weeks 6 days, 

live intrauterine 125 bpm, normal amniotic fluid, EDC 10/07/2019)


Critical care attestation.: 


If time is entered above; I have spent that time in minutes in the direct care 

of this critically ill patient, excluding procedure time.








ED Disposition


Clinical Impression: 


 Threatened 





Disposition: DC-01 TO HOME OR SELFCARE


Is pt being admited?: No


Does the pt Need Aspirin: No


Condition: Stable


Instructions:  Threatened Miscarriage (ED)


Additional Instructions: 


Please follow with her OB/GYN to have-year-old hCG levels reevaluated in 3 days.

 Return to the emergency department.  She is experiencing worsening bleeding, 

severe pain, presyncope or syncope, chest pain, fever, and he suggests that your

condition is worsening.  Continue no your current prenatal vitamins


Referrals: 


CHELLE COREA [Primary Care Provider] - 3-5 Days


[facility] [Provider Group] - 3-5 Days

## 2019-11-01 ENCOUNTER — HOSPITAL ENCOUNTER (OUTPATIENT)
Dept: HOSPITAL 5 - TRG | Age: 24
Discharge: HOME | End: 2019-11-01
Attending: OBSTETRICS & GYNECOLOGY
Payer: MEDICAID

## 2019-11-01 VITALS — DIASTOLIC BLOOD PRESSURE: 72 MMHG | SYSTOLIC BLOOD PRESSURE: 125 MMHG

## 2019-11-01 DIAGNOSIS — O47.02: ICD-10-CM

## 2019-11-01 DIAGNOSIS — O30.002: ICD-10-CM

## 2019-11-01 DIAGNOSIS — M54.5: ICD-10-CM

## 2019-11-01 DIAGNOSIS — O26.892: Primary | ICD-10-CM

## 2019-11-01 DIAGNOSIS — R10.30: ICD-10-CM

## 2019-11-01 DIAGNOSIS — Z3A.21: ICD-10-CM

## 2019-11-01 LAB
BACTERIA #/AREA URNS HPF: (no result) /HPF
BILIRUB UR QL STRIP: (no result)
BLOOD UR QL VISUAL: (no result)
MUCOUS THREADS #/AREA URNS HPF: (no result) /HPF
PH UR STRIP: 7 [PH] (ref 5–7)
RBC #/AREA URNS HPF: 9 /HPF (ref 0–6)
UROBILINOGEN UR-MCNC: < 2 MG/DL (ref ?–2)
WBC #/AREA URNS HPF: 4 /HPF (ref 0–6)

## 2019-11-01 PROCEDURE — 81001 URINALYSIS AUTO W/SCOPE: CPT

## 2019-11-01 PROCEDURE — 96360 HYDRATION IV INFUSION INIT: CPT

## 2019-11-01 PROCEDURE — 59025 FETAL NON-STRESS TEST: CPT

## 2019-11-01 PROCEDURE — 76815 OB US LIMITED FETUS(S): CPT

## 2019-11-01 NOTE — ULTRASOUND REPORT
ULTRASOUND OBSTETRIC LIMITED 



INDICATION / CLINICAL INFORMATION:

Fetal Heart Rates for Twins.



COMPARISON:

None available.



FINDINGS:



FETAL HEART RATE (beats per minute): Fetus A = 155 bpm. Fetus B = 156 bpm. 



ADDITIONAL FINDINGS: Cervical length is 2.8 cm.



IMPRESSION:

1. Twin intrauterine pregnancy with fetal heart rates within normal limits.



Signer Name: TEE Rubalcava MD 

Signed: 11/1/2019 11:43 PM

 Workstation Name: [id number]

## 2019-12-05 ENCOUNTER — HOSPITAL ENCOUNTER (INPATIENT)
Dept: HOSPITAL 5 - TRG | Age: 24
LOS: 12 days | Discharge: HOME | End: 2019-12-17
Attending: OBSTETRICS & GYNECOLOGY | Admitting: OBSTETRICS & GYNECOLOGY
Payer: MEDICAID

## 2019-12-05 DIAGNOSIS — O30.042: ICD-10-CM

## 2019-12-05 DIAGNOSIS — Z3A.26: ICD-10-CM

## 2019-12-05 DIAGNOSIS — O34.211: ICD-10-CM

## 2019-12-05 DIAGNOSIS — O45.93: ICD-10-CM

## 2019-12-05 DIAGNOSIS — O26.872: ICD-10-CM

## 2019-12-05 DIAGNOSIS — O45.92: ICD-10-CM

## 2019-12-05 LAB
ALBUMIN SERPL-MCNC: 3.8 G/DL (ref 3.9–5)
ALT SERPL-CCNC: 15 UNITS/L (ref 7–56)
BACTERIA #/AREA URNS HPF: (no result) /HPF
BASOPHILS # (AUTO): 0 K/MM3 (ref 0–0.1)
BASOPHILS NFR BLD AUTO: 0.1 % (ref 0–1.8)
BILIRUB UR QL STRIP: (no result)
BLOOD UR QL VISUAL: (no result)
BUN SERPL-MCNC: 9 MG/DL (ref 7–17)
BUN/CREAT SERPL: 18 %
CALCIUM SERPL-MCNC: 9.3 MG/DL (ref 8.4–10.2)
EOSINOPHIL # BLD AUTO: 0.1 K/MM3 (ref 0–0.4)
EOSINOPHIL NFR BLD AUTO: 0.6 % (ref 0–4.3)
HCT VFR BLD CALC: 31.5 % (ref 30.3–42.9)
HEMOLYSIS INDEX: 2
HGB BLD-MCNC: 10 GM/DL (ref 10.1–14.3)
LYMPHOCYTES # BLD AUTO: 1.8 K/MM3 (ref 1.2–5.4)
LYMPHOCYTES NFR BLD AUTO: 22 % (ref 13.4–35)
MCHC RBC AUTO-ENTMCNC: 32 % (ref 30–34)
MCV RBC AUTO: 83 FL (ref 79–97)
MONOCYTES # (AUTO): 0.7 K/MM3 (ref 0–0.8)
MONOCYTES % (AUTO): 8.5 % (ref 0–7.3)
MUCOUS THREADS #/AREA URNS HPF: (no result) /HPF
PH UR STRIP: 6 [PH] (ref 5–7)
PLATELET # BLD: 305 K/MM3 (ref 140–440)
PROT UR STRIP-MCNC: (no result) MG/DL
RBC # BLD AUTO: 3.78 M/MM3 (ref 3.65–5.03)
RBC #/AREA URNS HPF: 2 /HPF (ref 0–6)
UROBILINOGEN UR-MCNC: < 2 MG/DL (ref ?–2)
WBC #/AREA URNS HPF: < 1 /HPF (ref 0–6)

## 2019-12-05 PROCEDURE — 76816 OB US FOLLOW-UP PER FETUS: CPT

## 2019-12-05 PROCEDURE — 86850 RBC ANTIBODY SCREEN: CPT

## 2019-12-05 PROCEDURE — 86900 BLOOD TYPING SEROLOGIC ABO: CPT

## 2019-12-05 PROCEDURE — 85018 HEMOGLOBIN: CPT

## 2019-12-05 PROCEDURE — 85014 HEMATOCRIT: CPT

## 2019-12-05 PROCEDURE — 86901 BLOOD TYPING SEROLOGIC RH(D): CPT

## 2019-12-05 PROCEDURE — 36415 COLL VENOUS BLD VENIPUNCTURE: CPT

## 2019-12-05 PROCEDURE — 76815 OB US LIMITED FETUS(S): CPT

## 2019-12-05 PROCEDURE — 86920 COMPATIBILITY TEST SPIN: CPT

## 2019-12-05 PROCEDURE — 85025 COMPLETE CBC W/AUTO DIFF WBC: CPT

## 2019-12-05 PROCEDURE — 81001 URINALYSIS AUTO W/SCOPE: CPT

## 2019-12-05 PROCEDURE — 85007 BL SMEAR W/DIFF WBC COUNT: CPT

## 2019-12-05 PROCEDURE — 83735 ASSAY OF MAGNESIUM: CPT

## 2019-12-05 PROCEDURE — 76819 FETAL BIOPHYS PROFIL W/O NST: CPT

## 2019-12-05 PROCEDURE — 88307 TISSUE EXAM BY PATHOLOGIST: CPT

## 2019-12-05 PROCEDURE — P9016 RBC LEUKOCYTES REDUCED: HCPCS

## 2019-12-05 PROCEDURE — 85027 COMPLETE CBC AUTOMATED: CPT

## 2019-12-05 PROCEDURE — 80053 COMPREHEN METABOLIC PANEL: CPT

## 2019-12-05 RX ADMIN — MAGNESIUM SULFATE HEPTAHYDRATE SCH MLS/HR: 40 INJECTION, SOLUTION INTRAVENOUS at 17:10

## 2019-12-05 RX ADMIN — BETAMETHASONE SODIUM PHOSPHATE AND BETAMETHASONE ACETATE SCH MG: 3; 3 INJECTION, SUSPENSION INTRA-ARTICULAR; INTRALESIONAL; INTRAMUSCULAR at 16:38

## 2019-12-05 RX ADMIN — AMPICILLIN SODIUM SCH MLS/HR: 1 INJECTION, POWDER, FOR SOLUTION INTRAMUSCULAR; INTRAVENOUS at 20:52

## 2019-12-05 RX ADMIN — SODIUM CHLORIDE, SODIUM LACTATE, POTASSIUM CHLORIDE, AND CALCIUM CHLORIDE SCH MLS/HR: .6; .31; .03; .02 INJECTION, SOLUTION INTRAVENOUS at 17:00

## 2019-12-05 NOTE — HISTORY AND PHYSICAL REPORT
History of Present Illness


Date of examination: 19


Chief complaint: 





Twins; Shortened cervix


History of present illness: 





Pt is a 23yo BF  EDC 3/7/20;  EGA 26 5/7 weeks presents to L&D from the 

office for evaluation of shortened cervix.  She received prenatal care at 

[facility] since 13 weeks and co-managed by [facility] for Twin gestation

(Di/Di), shortened cervix and Alpha Thalassemia carrier. She denied 

contractions, but cervix was 3/50/-3 in the office per CNM. Prenatal records are

available.





Past History


Past Medical History: no pertinent history


Past Surgical History:  section


GYN History: trichomonas


Social history: no significant social history, single





- Obstetrical History


Expected Date of Delivery: 20


Actual Gestation: 26 Week(s) 6 Day(s) 


: 4





Medications and Allergies


                                    Allergies











Allergy/AdvReac Type Severity Reaction Status Date / Time


 


No Known Allergies Allergy   Verified 19 21:51











                                Home Medications











 Medication  Instructions  Recorded  Confirmed  Last Taken  Type


 


Prenatal Vit-Fe Fumar-FA [Prenatal 1 tab PO Q24H 19 Hist

ory





Vitamin]     











Active Meds: 


Active Medications





Acetaminophen (Tylenol)  650 mg PO Q4H PRN


   PRN Reason: Pain MILD(1-3)/Fever >100.5/HA


Betamethasone Acet/Betameth SodPhos (Celestone Soluspan)  12 mg IM Q24HR JULIA


   Stop: 19 10:01


Diphenhydramine HCl (Benadryl)  25 mg PO Q6H PRN


   PRN Reason: Itching


Docusate Sodium (Colace)  100 mg PO Q12H PRN


   PRN Reason: Constipation


Lactated Ringer's (Lactated Ringers)  1,000 mls @ 125 mls/hr IV AS DIRECT JULIA


Magnesium Sulfate (Magnesium Sulfate 4gm/100ml)  4 gm in 100 mls @ 300 mls/hr IV

ONCE ONE


   Stop: 19 14:28


Magnesium Sulfate (Magnesium Sulfate 40gm/1000ml)  40 gm in 1,000 mls @ 50 

mls/hr IV AS DIRECT JULIA


Multivitamins/Iron/Calcium (Prenatal Vitamin)  1 each PO QDAY JULIA


Ondansetron HCl (Zofran)  4 mg IV Q6H PRN


   PRN Reason: Nausea And Vomiting


Senna/Docusate Sodium (Senokot S)  2 tab PO Q12H PRN


   PRN Reason: Laxative Effect


Simethicone (Mylicon)  80 mg PO Q6H PRN


   PRN Reason: Gas pain











Review of Systems


All systems: negative





- Physical Exam


Breasts: Positive: deferred


Cardiovascular: Regular rate


Lungs: Positive: Clear to auscultation


Abdomen: Positive: normal appearance, soft


Genitourinary (Female): Positive: normal external genitalia


Uterus: Positive: enlarged


Extremities: Positive: normal





- Obstetrical


FHR: category 1


Uterine Contraction Monitor Mode: External


Cervical Dilatation: 3 (per CNM)


Cervical Effacement Percentage: 50


Fetal station: -3





Results


Result Diagrams: 


                                 19 16:46





                                 19 16:46


All other labs normal.








Assessment and Plan





- Patient Problems


(1) 26 weeks gestation of pregnancy


Onset Date: 19   Current Visit: Yes   Status: Acute   


Plan to address problem: 


A:  IUP @ 26 5/7 weeks


      Twin gestation (Di/Di)


      Previous C Section


      Shortened cervix





 P:  Admit to L&D for Observation


      Will begin IV Magnesium sulfate, IM steroids and IV Ampicillin


      Obtain NICU and [facility] consultation








(2) Twin gestation, dichorionic diamniotic


Onset Date: 19   Current Visit: Yes   Status: Acute   


Qualifiers: 


   Trimester: third trimester   Qualified Code(s): O30.043 - Twin pregnancy, 

dichorionic/diamniotic, third trimester   





(3) Previous  section complicating pregnancy


Onset Date: 19   Current Visit: Yes   Status: Acute   





(4) Cervical shortening affecting pregnancy in third trimester


Onset Date: 19   Current Visit: Yes   Status: Acute

## 2019-12-05 NOTE — ULTRASOUND REPORT
Limited OB ultrasound for cervical length



INDICATION: Twin gestation







FINDINGS: There is a twin gestation. Cervix measures 2.4 cm in length. There is a fundal pressure not
ed with the upper cervix measures 1.8 cm.

Baby A is breech and baby B is in a transverse position with head on maternal right









BIOPHYSICAL PROFILE



TWIN A



INDICATION: 

Twin gestation



COMPARISON:

None



FINDINGS:



Fetal breathing movement: 2/2



Fetal movement: 2/2



Fetal posture and tone: 2/2



Qualitative amniotic fluid volume: 2/2



IMPRESSION:



Total score for biophysical profile is 8/8



Fetal heart rate is 157 bpm











BIOPHYSICAL PROFILE





Twin B





INDICATION: 

Twin gestation



COMPARISON:

None



FINDINGS:



Fetal breathing movement: 2/2



Fetal movement: 2/2



Fetal posture and tone: 2/2



Qualitative amniotic fluid volume: 2/2



IMPRESSION:



Total score for biophysical profile is 8/8



Fetal heart rate is 147 bpm















Signer Name: Jimy Venegas MD 

Signed: 12/5/2019 4:49 PM

 Workstation Name: [id number]

## 2019-12-06 RX ADMIN — MAGNESIUM SULFATE HEPTAHYDRATE SCH MLS/HR: 40 INJECTION, SOLUTION INTRAVENOUS at 14:00

## 2019-12-06 RX ADMIN — AMPICILLIN SODIUM SCH MLS/HR: 1 INJECTION, POWDER, FOR SOLUTION INTRAMUSCULAR; INTRAVENOUS at 08:39

## 2019-12-06 RX ADMIN — AMPICILLIN SODIUM SCH MLS/HR: 1 INJECTION, POWDER, FOR SOLUTION INTRAMUSCULAR; INTRAVENOUS at 20:57

## 2019-12-06 RX ADMIN — AMPICILLIN SODIUM SCH MLS/HR: 1 INJECTION, POWDER, FOR SOLUTION INTRAMUSCULAR; INTRAVENOUS at 15:09

## 2019-12-06 RX ADMIN — BETAMETHASONE SODIUM PHOSPHATE AND BETAMETHASONE ACETATE SCH MG: 3; 3 INJECTION, SUSPENSION INTRA-ARTICULAR; INTRALESIONAL; INTRAMUSCULAR at 16:13

## 2019-12-06 RX ADMIN — SODIUM CHLORIDE, SODIUM LACTATE, POTASSIUM CHLORIDE, AND CALCIUM CHLORIDE SCH MLS/HR: .6; .31; .03; .02 INJECTION, SOLUTION INTRAVENOUS at 06:12

## 2019-12-06 RX ADMIN — MAGNESIUM SULFATE HEPTAHYDRATE SCH MLS/HR: 40 INJECTION, SOLUTION INTRAVENOUS at 18:54

## 2019-12-06 RX ADMIN — AMPICILLIN SODIUM SCH MLS/HR: 1 INJECTION, POWDER, FOR SOLUTION INTRAMUSCULAR; INTRAVENOUS at 03:00

## 2019-12-06 RX ADMIN — MAGNESIUM SULFATE HEPTAHYDRATE SCH MLS/HR: 40 INJECTION, SOLUTION INTRAVENOUS at 08:38

## 2019-12-06 NOTE — CONSULTATION
History of Present Illness


Consult date: 19


Requesting physician: NIKOLAI SMITH


History of present illness: 





Ms. Hinojosa is a 23yo, , at 26 6/7 weeks with ROSEANNE of 3/7/20. 





She is followed outpatient by [facility] secondary to Andrew twin pregnancy, PTL/D hx, 

and Cervical shortening with current pregnancy. 





She was recently seen in [facility] office on 19 for twin and cervical length 

surveillance.  





Cervical length was 1.17cm by transvaginal ultrasound assessment and reassuring 

fetal surveillance appreciated during assessment. 





She presents to L&D from primary OB office on 19 after evaluation of cervix

to be 3-4 cm dilated around 1 PM then rechecked later and told she was 5 cm 

dilated





Mg was dc'd due to Level increased at 6.5





To get Second dose steroids today





EFM occ contrax but patient states not feeling regula contraction - "I feel a 

lot of pressure"














Past History


Past Medical History: no pertinent history


Past Surgical History:  section


GYN History: trichomonas





- Obstetrical History


: 4


Hx # Term Pregnancies: 1


Number of  Pregnancies: 1


Number of Living Children: 2














Past History


Past Medical History: no pertinent history


Past Surgical History:  section


GYN History: trichomonas





- Obstetrical History


: 4





Medications and Allergies


                                    Allergies











Allergy/AdvReac Type Severity Reaction Status Date / Time


 


No Known Allergies Allergy   Verified 19 21:51











                                Home Medications











 Medication  Instructions  Recorded  Confirmed  Last Taken  Type


 


Prenatal Vit-Fe Fumar-FA [Prenatal 1 tab PO Q24H 19 

History





Vitamin]     











Active Meds: 


Active Medications





Acetaminophen (Tylenol)  650 mg PO Q4H PRN


   PRN Reason: Pain MILD(1-3)/Fever >100.5/HA


Diphenhydramine HCl (Benadryl)  25 mg PO Q6H PRN


   PRN Reason: Itching


Docusate Sodium (Colace)  100 mg PO Q12H PRN


   PRN Reason: Constipation


Lactated Ringer's (Lactated Ringers)  1,000 mls @ 125 mls/hr IV AS DIRECT JULIA


   Last Admin: 19 06:12 Dose:  125 mls/hr


   Documented by: 


Magnesium Sulfate (Magnesium Sulfate 40gm/1000ml)  40 gm in 1,000 mls @ 50 

mls/hr IV AS DIRECT JULIA


   Last Admin: 19 08:38 Dose:  2 gm/hr, 50 mls/hr


   Documented by: 


Ampicillin Sodium (Ampicillin/Ns 1 Gm/50 Ml)  1 gm in 50 mls @ 100 mls/hr IV Q6H

[facility]; Protocol


   Last Admin: 19 08:39 Dose:  100 mls/hr


   Documented by: 


Multivitamins/Iron/Calcium (Prenatal Vitamin)  1 each PO QDAY [facility]


Ondansetron HCl (Zofran)  4 mg IV Q6H PRN


   PRN Reason: Nausea And Vomiting


Senna/Docusate Sodium (Senokot S)  2 tab PO Q12H PRN


   PRN Reason: Laxative Effect


Simethicone (Mylicon)  80 mg PO Q6H PRN


   PRN Reason: Gas pain











- Vital Signs


Vital signs: 


                                   Vital Signs











Pulse BP


 


 103 H  119/67 


 


 19 14:33  19 14:33








                                        











Temp Pulse Resp BP Pulse Ox


 


 97.5 F L  111 H  18   109/53   99 


 


 19 07:00  19 13:47  19 19:15  19 13:24  19 13:47














Results


Result Diagrams: 


                                 19 16:46





                                 19 16:46


                              Abnormal lab results











  19 Range/Units





  16:46 16:46 00:51 


 


Hgb  10.0 L    (10.1-14.3)  gm/dl


 


MCH  26 L    (28-32)  pg


 


RDW  16.1 H    (13.2-15.2)  %


 


Mono % (Auto)  8.5 H    (0.0-7.3)  %


 


Sodium   135 L   (137-145)  mmol/L


 


Potassium   3.4 L   (3.6-5.0)  mmol/L


 


Carbon Dioxide   19 L   (22-30)  mmol/L


 


Creatinine   0.5 L   (0.7-1.2)  mg/dL


 


Magnesium    5.50 H  (1.7-2.3)  mg/dL


 


Albumin   3.8 L   (3.9-5)  g/dL














  19 Range/Units





  06:06 11:55 


 


Hgb    (10.1-14.3)  gm/dl


 


MCH    (28-32)  pg


 


RDW    (13.2-15.2)  %


 


Mono % (Auto)    (0.0-7.3)  %


 


Sodium    (137-145)  mmol/L


 


Potassium    (3.6-5.0)  mmol/L


 


Carbon Dioxide    (22-30)  mmol/L


 


Creatinine    (0.7-1.2)  mg/dL


 


Magnesium  6.00 H  6.50 H  (1.7-2.3)  mg/dL


 


Albumin    (3.9-5)  g/dL








All other labs normal.








Assessment and Plan











Assessment and Plan








A-


Andrew IUP 26 6/7 weeks (ROSEANNE 3/7/20)


VSS


------------


BPP and ultrasound performed inpatient-


Twin A BPP 8/8 Breech


Twin B BPP 8/8 Transverse


------------


Cervical shortening- 1.17cm ( by [facility] assessment on 19)


PTL/D hx


Magnesium Sulfate dc'd due to elevated level


Ampicillin ordered


Betamethasone x 1 received


Positive Trichomonas in October, MAVERICK performed at primary OB today     


     











P-


Continue with plan of care


Magnesium Sulfate for Neuro-protection


Administer Betamethasone x 2 for fetal lung maturity


Continuous fetal monitoring, toco for contractions


Tocolytics as indicated


May try Indocin 50 mg load then 25 mg q 6 hours x 3 days


NICU consult


Consider delivery with fetal distress/nonreassuring fetal behavior.


If continues to dilate would proceed to Repeat C/S if stable 


If no contractions and remains 5 cm could continue conservative management

## 2019-12-06 NOTE — PROGRESS NOTE
Assessment and Plan





- Patient Problems


(1) 26 weeks gestation of pregnancy


Onset Date: 19   Current Visit: Yes   Status: Acute   


Plan to address problem: 


A:  IUP @ 26 6/7 weeks


      Twin gestation (Di/Di)


      Previous C Section


      Shortened cervix


      Advanced cervical dilatation





 P:  Continue with Observation


      Continue with IV Magnesium sulfate, IM steroids and IV Ampicillin


      Appreciate NICU and [facility] consultation


      Will deliver by Repeat C Section 19 after 2nd steroid dose today








(2) Twin gestation, dichorionic diamniotic


Onset Date: 19   Current Visit: Yes   Status: Acute   


Qualifiers: 


   Trimester: third trimester   Qualified Code(s): O30.043 - Twin pregnancy, 

dichorionic/diamniotic, third trimester   





(3) Previous  section complicating pregnancy


Onset Date: 19   Current Visit: Yes   Status: Acute   





(4) Cervical shortening affecting pregnancy in third trimester


Onset Date: 19   Current Visit: Yes   Status: Acute   





Subjective





- Subjective


Date of service: 19


Principal diagnosis: IUP @ 26 6/7 weeks;  Twin gestation; PTL


Interval history: 





Pt is a 25yo BF  EDC 3/7/20;  EGA 26 6/7 weeks presented to L&D from the 

office for evaluation of shortened cervix.  She received prenatal care at 

[facility] since 13 weeks and co-managed by [facility] for Twin gestation

(Di/Di), shortened cervix and Alpha Thalassemia carrier. She denied 

contractions, but cervix was 3/50/-3 in the office per CNM. She is currently on 

IV Magnesium sulfate, IV Ampicillin and received her 1st dose of steroids, but 

is having occasional contractions.


Patient reports: fetal movement normal, contractions, no new complaints, no loss

of fluid, no vaginal bleeding





Objective





- Vital Signs


Vital Signs: 


                               Vital Signs - 12hr











  19





  20:48 20:53 20:58


 


Temperature   


 


Pulse Rate 121 H 113 H 114 H


 


Blood Pressure   


 


O2 Sat by Pulse 100 100 100





Oximetry   














  19





  21:03 21:06 21:07


 


Temperature   


 


Pulse Rate 118 H 111 H 104 H


 


Blood Pressure  98/55 97/54


 


O2 Sat by Pulse 100  





Oximetry   














  19





  21:08 21:13 21:19


 


Temperature   


 


Pulse Rate 122 H 108 H 110 H


 


Blood Pressure   


 


O2 Sat by Pulse 100 100 100





Oximetry   














  19





  21:24 21:29 21:34


 


Temperature   


 


Pulse Rate 118 H 117 H 122 H


 


Blood Pressure   


 


O2 Sat by Pulse 100 99 100





Oximetry   














  19





  21:39 21:44 21:49


 


Temperature   


 


Pulse Rate 123 H 125 H 118 H


 


Blood Pressure   


 


O2 Sat by Pulse 100 99 100





Oximetry   














  19





  21:54 21:59 22:04


 


Temperature   


 


Pulse Rate 115 H 117 H 114 H


 


Blood Pressure   


 


O2 Sat by Pulse 100 98 99





Oximetry   














  19





  22:09 22:14 22:19


 


Temperature   


 


Pulse Rate 109 H 113 H 119 H


 


Blood Pressure   


 


O2 Sat by Pulse 98 98 97





Oximetry   














  19





  22:24 22:29 22:34


 


Temperature   


 


Pulse Rate 116 H 114 H 112 H


 


Blood Pressure 100/54  


 


O2 Sat by Pulse 97 97 97





Oximetry   














  19





  22:39 22:44 22:49


 


Temperature   


 


Pulse Rate 117 H 106 H 113 H


 


Blood Pressure   


 


O2 Sat by Pulse 96 97 96





Oximetry   














  19





  22:53 22:54 22:59


 


Temperature   


 


Pulse Rate 114 H 114 H 107 H


 


Blood Pressure   


 


O2 Sat by Pulse 93 94 93





Oximetry   














  19





  23:04 23:07 23:09


 


Temperature   


 


Pulse Rate 105 H 111 H 108 H


 


Blood Pressure   


 


O2 Sat by Pulse 93 92 97





Oximetry   














  19





  23:14 23:24 23:32


 


Temperature   


 


Pulse Rate 101 H 115 H 105 H


 


Blood Pressure  99/48 


 


O2 Sat by Pulse 98  98





Oximetry   














  19





  23:37 23:44 23:49


 


Temperature   


 


Pulse Rate 110 H 116 H 106 H


 


Blood Pressure   


 


O2 Sat by Pulse 99 99 97





Oximetry   














  19





  23:52 23:54 23:59


 


Temperature   


 


Pulse Rate 107 H 110 H 113 H


 


Blood Pressure 97/54  


 


O2 Sat by Pulse  98 97





Oximetry   














  19





  00:04 00:09 00:14


 


Temperature   


 


Pulse Rate 112 H 116 H 116 H


 


Blood Pressure   


 


O2 Sat by Pulse 98 99 98





Oximetry   














  19





  00:19 00:24 00:25


 


Temperature   


 


Pulse Rate 108 H 113 H 112 H


 


Blood Pressure   98/57


 


O2 Sat by Pulse 98 98 





Oximetry   














  19





  00:29 00:34 00:39


 


Temperature   


 


Pulse Rate 112 H 110 H 109 H


 


Blood Pressure   


 


O2 Sat by Pulse 96 94 95





Oximetry   














  19





  00:43 00:44 00:49


 


Temperature   


 


Pulse Rate 108 H 109 H 110 H


 


Blood Pressure   


 


O2 Sat by Pulse 94 94 94





Oximetry   














  19





  00:54 00:59 01:04


 


Temperature   


 


Pulse Rate 113 H 109 H 113 H


 


Blood Pressure   


 


O2 Sat by Pulse 97 96 97





Oximetry   














  19





  01:09 01:14 01:19


 


Temperature   


 


Pulse Rate 114 H 109 H 111 H


 


Blood Pressure   


 


O2 Sat by Pulse 97 97 98





Oximetry   














  19





  01:24 01:27 01:29


 


Temperature   


 


Pulse Rate 117 H 117 H 112 H


 


Blood Pressure 93/46 88/42 93/46


 


O2 Sat by Pulse 99  97





Oximetry   














  19





  01:34 01:39 01:44


 


Temperature   


 


Pulse Rate 109 H 115 H 109 H


 


Blood Pressure   


 


O2 Sat by Pulse 96 96 98





Oximetry   














  19





  01:49 01:54 01:59


 


Temperature   


 


Pulse Rate 111 H 106 H 107 H


 


Blood Pressure   


 


O2 Sat by Pulse 96 96 97





Oximetry   














  19





  02:04 02:09 02:14


 


Temperature   


 


Pulse Rate 116 H 119 H 111 H


 


Blood Pressure   


 


O2 Sat by Pulse 98 98 100





Oximetry   














  19





  02:19 02:24 02:25


 


Temperature   


 


Pulse Rate 111 H 107 H 114 H


 


Blood Pressure   97/52


 


O2 Sat by Pulse 99 99 





Oximetry   














  19





  02:29 02:34 02:39


 


Temperature   


 


Pulse Rate 112 H 113 H 119 H


 


Blood Pressure   


 


O2 Sat by Pulse 97 99 98





Oximetry   














  19





  02:44 02:49 02:54


 


Temperature   


 


Pulse Rate 118 H 114 H 116 H


 


Blood Pressure   


 


O2 Sat by Pulse 97 97 99





Oximetry   














  19





  02:59 03:04 03:09


 


Temperature   


 


Pulse Rate 107 H 110 H 111 H


 


Blood Pressure   


 


O2 Sat by Pulse 98 99 100





Oximetry   














  19





  03:14 03:19 03:24


 


Temperature   


 


Pulse Rate 111 H 112 H 110 H


 


Blood Pressure   110/55


 


O2 Sat by Pulse 97 98 98





Oximetry   














  19





  03:29 03:34 03:39


 


Temperature   


 


Pulse Rate 106 H 106 H 110 H


 


Blood Pressure   


 


O2 Sat by Pulse 96 97 98





Oximetry   














  19





  03:44 03:49 03:54


 


Temperature   


 


Pulse Rate 107 H 107 H 105 H


 


Blood Pressure   


 


O2 Sat by Pulse 97 97 96





Oximetry   














  19





  03:59 04:04 04:09


 


Temperature   


 


Pulse Rate 116 H 113 H 109 H


 


Blood Pressure   


 


O2 Sat by Pulse 98 98 96





Oximetry   














  19





  04:14 04:18 04:19


 


Temperature   


 


Pulse Rate 117 H 110 H 110 H


 


Blood Pressure   


 


O2 Sat by Pulse 97 94 94





Oximetry   














  19





  04:24 04:25 04:26


 


Temperature  98.4 F 


 


Pulse Rate 100 H 109 H 105 H


 


Blood Pressure   99/52


 


O2 Sat by Pulse 94 93 





Oximetry   














  19





  04:29 04:32 04:34


 


Temperature   


 


Pulse Rate 108 H 107 H 107 H


 


Blood Pressure   


 


O2 Sat by Pulse 94 94 98





Oximetry   














  19





  04:39 04:44 04:49


 


Temperature   


 


Pulse Rate 104 H 105 H 110 H


 


Blood Pressure   


 


O2 Sat by Pulse 98 99 100





Oximetry   














  1219





  04:54 04:59 05:04


 


Temperature   


 


Pulse Rate 110 H 109 H 111 H


 


Blood Pressure   


 


O2 Sat by Pulse 99 100 98





Oximetry   














  19





  05:09 05:14 05:19


 


Temperature   


 


Pulse Rate 102 H 108 H 110 H


 


Blood Pressure   


 


O2 Sat by Pulse 98 99 100





Oximetry   














  19





  05:24 05:25 05:29


 


Temperature   


 


Pulse Rate 68 106 H 109 H


 


Blood Pressure  98/43 


 


O2 Sat by Pulse 86  100





Oximetry   














  19





  05:34 05:39 05:44


 


Temperature   


 


Pulse Rate 106 H 113 H 111 H


 


Blood Pressure   


 


O2 Sat by Pulse 100 100 99





Oximetry   














  19





  05:49 05:54 05:59


 


Temperature   


 


Pulse Rate 116 H 113 H 109 H


 


Blood Pressure   


 


O2 Sat by Pulse 98 100 100





Oximetry   














  19





  06:04 06:09 06:14


 


Temperature   


 


Pulse Rate 110 H 111 H 100 H


 


Blood Pressure   


 


O2 Sat by Pulse 100 100 99





Oximetry   














  19





  06:19 06:24 06:29


 


Temperature   


 


Pulse Rate 101 H 108 H 108 H


 


Blood Pressure  103/55 


 


O2 Sat by Pulse 100 100 98





Oximetry   














  19





  06:34 06:39 06:44


 


Temperature   


 


Pulse Rate 103 H 110 H 105 H


 


Blood Pressure   


 


O2 Sat by Pulse 96 99 100





Oximetry   














  19





  06:49 06:54 06:59


 


Temperature   


 


Pulse Rate 105 H 104 H 106 H


 


Blood Pressure   


 


O2 Sat by Pulse 100 100 100





Oximetry   














  19





  07:04 07:09 07:14


 


Temperature   


 


Pulse Rate 106 H 107 H 111 H


 


Blood Pressure   


 


O2 Sat by Pulse 100 100 100





Oximetry   














  19





  07:19 07:25 07:30


 


Temperature   


 


Pulse Rate 106 H 105 H 105 H


 


Blood Pressure  106/65 


 


O2 Sat by Pulse 100 100 100





Oximetry   














  19





  08:33 08:36 08:38


 


Temperature   


 


Pulse Rate 53 L 101 H 107 H


 


Blood Pressure  93/50 


 


O2 Sat by Pulse 90  99





Oximetry   














  19





  08:42 08:43


 


Temperature  


 


Pulse Rate 93 H 103 H


 


Blood Pressure  


 


O2 Sat by Pulse 94 93





Oximetry  














- Exam


Breasts: deferred


Abdomen: Present: normal appearance, soft


Uterus: Present: normal


FHR: category 1


Uterine Contraction Monitor Mode: External


Cervical Dilatation: 5


Cervical Effacement Percentage: 60


Fetal station: -3


Uterine Contraction Pattern: Irregular


Uterine Tone Measurement Phase: Contraction


Uterine Contraction Intensity: Mild





- Labs


Labs: 


                                  Abnormal Labs











  19





  16:46 16:46 00:51


 


Hgb  10.0 L  


 


MCH  26 L  


 


RDW  16.1 H  


 


Mono % (Auto)  8.5 H  


 


Sodium   135 L 


 


Potassium   3.4 L 


 


Carbon Dioxide   19 L 


 


Creatinine   0.5 L 


 


Magnesium    5.50 H


 


Albumin   3.8 L 














  19





  06:06


 


Hgb 


 


MCH 


 


RDW 


 


Mono % (Auto) 


 


Sodium 


 


Potassium 


 


Carbon Dioxide 


 


Creatinine 


 


Magnesium  6.00 H


 


Albumin 








                         Laboratory Results - last 24 hr











  19





  16:46 16:46 16:46


 


WBC  8.3  


 


RBC  3.78  


 


Hgb  10.0 L  


 


Hct  31.5  


 


MCV  83  


 


MCH  26 L  


 


MCHC  32  


 


RDW  16.1 H  


 


Plt Count  305  


 


Lymph % (Auto)  22.0  


 


Mono % (Auto)  8.5 H  


 


Eos % (Auto)  0.6  


 


Baso % (Auto)  0.1  


 


Lymph #  1.8  


 


Mono #  0.7  


 


Eos #  0.1  


 


Baso #  0.0  


 


Seg Neutrophils %  68.8  


 


Seg Neutrophils #  5.7  


 


Sodium   135 L 


 


Potassium   3.4 L 


 


Chloride   99.9 


 


Carbon Dioxide   19 L 


 


Anion Gap   20 


 


BUN   9 


 


Creatinine   0.5 L 


 


Estimated GFR   > 60 


 


BUN/Creatinine Ratio   18 


 


Glucose   68 


 


Calcium   9.3 


 


Magnesium   


 


Total Bilirubin   0.20 


 


AST   15 


 


ALT   15 


 


Alkaline Phosphatase   66 


 


Total Protein   7.7 


 


Albumin   3.8 L 


 


Albumin/Globulin Ratio   1.0 


 


Urine Color   


 


Urine Turbidity   


 


Urine pH   


 


Ur Specific Gravity   


 


Urine Protein   


 


Urine Glucose (UA)   


 


Urine Ketones   


 


Urine Blood   


 


Urine Nitrite   


 


Urine Bilirubin   


 


Urine Urobilinogen   


 


Ur Leukocyte Esterase   


 


Urine WBC (Auto)   


 


Urine RBC (Auto)   


 


U Epithel Cells (Auto)   


 


Urine Bacteria (Auto)   


 


Urine Mucus   


 


Blood Type    O POSITIVE


 


Antibody Screen    Negative














  19





  16:46 Unknown 00:51


 


WBC   


 


RBC   


 


Hgb   


 


Hct   


 


MCV   


 


MCH   


 


MCHC   


 


RDW   


 


Plt Count   


 


Lymph % (Auto)   


 


Mono % (Auto)   


 


Eos % (Auto)   


 


Baso % (Auto)   


 


Lymph #   


 


Mono #   


 


Eos #   


 


Baso #   


 


Seg Neutrophils %   


 


Seg Neutrophils #   


 


Sodium   


 


Potassium   


 


Chloride   


 


Carbon Dioxide   


 


Anion Gap   


 


BUN   


 


Creatinine   


 


Estimated GFR   


 


BUN/Creatinine Ratio   


 


Glucose   


 


Calcium   


 


Magnesium  1.90   5.50 H


 


Total Bilirubin   


 


AST   


 


ALT   


 


Alkaline Phosphatase   


 


Total Protein   


 


Albumin   


 


Albumin/Globulin Ratio   


 


Urine Color   Yellow 


 


Urine Turbidity   Clear 


 


Urine pH   6.0 


 


Ur Specific [place]   1.011 


 


Urine Protein   <15 mg/dl 


 


Urine Glucose (UA)   Neg 


 


Urine Ketones   20 


 


Urine Blood   Sm 


 


Urine Nitrite   Neg 


 


Urine Bilirubin   Neg 


 


Urine Urobilinogen   < 2.0 


 


Ur Leukocyte Esterase   Neg 


 


Urine WBC (Auto)   < 1.0 


 


Urine RBC (Auto)   2.0 


 


U Epithel Cells (Auto)   < 1.0 


 


Urine Bacteria (Auto)   1+ 


 


Urine Mucus   Few 


 


Blood Type   


 


Antibody Screen   














  19





  06:06


 


WBC 


 


RBC 


 


Hgb 


 


Hct 


 


MCV 


 


MCH 


 


MCHC 


 


RDW 


 


Plt Count 


 


Lymph % (Auto) 


 


Mono % (Auto) 


 


Eos % (Auto) 


 


Baso % (Auto) 


 


Lymph # 


 


Mono # 


 


Eos # 


 


Baso # 


 


Seg Neutrophils % 


 


Seg Neutrophils # 


 


Sodium 


 


Potassium 


 


Chloride 


 


Carbon Dioxide 


 


Anion Gap 


 


BUN 


 


Creatinine 


 


Estimated GFR 


 


BUN/Creatinine Ratio 


 


Glucose 


 


Calcium 


 


Magnesium  6.00 H


 


Total Bilirubin 


 


AST 


 


ALT 


 


Alkaline Phosphatase 


 


Total Protein 


 


Albumin 


 


Albumin/Globulin Ratio 


 


Urine Color 


 


Urine Turbidity 


 


Urine pH 


 


Ur Specific Gravity 


 


Urine Protein 


 


Urine Glucose (UA) 


 


Urine Ketones 


 


Urine Blood 


 


Urine Nitrite 


 


Urine Bilirubin 


 


Urine Urobilinogen 


 


Ur Leukocyte Esterase 


 


Urine WBC (Auto) 


 


Urine RBC (Auto) 


 


U Epithel Cells (Auto) 


 


Urine Bacteria (Auto) 


 


Urine Mucus 


 


Blood Type 


 


Antibody Screen 














- Results


US- obstetric: report reviewed (Twin A - Breech; Twin B - Transverse; Cervical 

length 1.8cm)

## 2019-12-06 NOTE — CONSULTATION
Prenatal Consult Note





- Parent Education


I met with parent(s) and discussed the following:: Need for NICU admission, Poss

ible need for intubation and surfactant or other resp support, Temperature 

regulation, Head ultrasounds to evaluate IVH, Eye exams for ROP screening, 

Possible need for IV fluids/TPN and IV antibiotics, Possible need for umbilical 

lines, Importance of providing breast milk & encouraged pumping aft delivery, 

Donor breast milk if baby meets criteria after birth, Slow feeding advancement 

and monitoring of tolerance. NG/OG feeds, Need to monitor for jaundice, Data for

survival & survival without significant co-morbidities


Parent(s) demonstrated understanding of all the information:: Yes





Assessment and Plan





- Assessment


Gestation:: 26 (26 6/7 weeker)


Estimated Fetal Weight: N/A


Baby's gender: Female


Baby's name: Narinder Muñiz


Additional Comment: 26 6/7 weeker, di-di twins to a 22 YO  mother,  ROSEANNE 

3/7/20.  Previous CS, presenting with advance cervial dilation, PTL. H/O Alpha 

thalassemia carrier and previous  infant at 31 week at [facility] in 2018.  

Received steroid x1 and will receive second dose today, on ampicillin, and mag. 

sulfate.





- Plan


Plan: 


Agree with Mag & steroids


Will attend delivery


Please call NICU with questions

## 2019-12-07 RX ADMIN — AMPICILLIN SODIUM SCH MLS/HR: 1 INJECTION, POWDER, FOR SOLUTION INTRAMUSCULAR; INTRAVENOUS at 08:24

## 2019-12-07 RX ADMIN — INDOMETHACIN SCH MG: 25 CAPSULE ORAL at 10:57

## 2019-12-07 RX ADMIN — Medication SCH EACH: at 10:58

## 2019-12-07 RX ADMIN — INDOMETHACIN SCH MG: 25 CAPSULE ORAL at 22:05

## 2019-12-07 NOTE — ANESTHESIA CONSULTATION
Anesthesia Consult and Med Hx


Date of service: 12/07/19





- Airway


Anesthetic Teeth Evaluation: Good


ROM Head & Neck: Adequate


Mental/Hyoid Distance: Adequate


Mallampati Class: Class II


Intubation Access Assessment: Probably Good





- Pulmonary Exam


CTA: Yes





- Cardiac Exam


Cardiac Exam: RRR





- Pre-Operative Health Status


ASA Pre-Surgery Classification: ASA2


Proposed Anesthetic Plan: Spinal





- Pre-Anesthesia Comment


Pre-Anesthesia Comments: PSH: CSECTION. NO ANESTHESIA COMPLICATIONS





- Pulmonary


Hx Smoking: No


Hx Asthma: No


Hx Respiratory Symptoms: No


SOB: No


COPD: No


Home Oxygen Therapy: No


Hx Pneumonia: No


Hx Sleep Apnea: No





- Cardiovascular System


Hx Hypertension: No


Hx Coronary Artery Disease: No


Hx Heart Attack/AMI: No


Hx Angina: No


Hx Percutaneous Transluminal Coronary Angioplasty (PTCA): No


Hx Cardia Arrhythmia: No


Hx Pacemaker: No


Hx Internal Defibrillator: No


Hx Valvular Heart Disease: No


Hx Heart Murmur: No


Hx Peripheral Vascular Disease: No





- Central Nervous System


Hx Neuromuscular Disorder: No


Hx Seizures: No


CVA: No


Hx Back Pain: No


Hx Psychiatric Problems: No





- Gastrointestinal


Hx Ulcer: No


Hx Gastroesophageal Reflux Disease: No





- Endocrine


Hx Renal Disease: No


Hx End Stage Renal Disease: No


Hx Cirrhosis: No


Hx Liver Disease: No


Hx Insulin Dependent Diabetes: No


Hx Non-Insulin Dependent Diabetes: No


Hx Thyroid Disease: No


Hx Hypothyroidism: No


Hx Hyperthyroidism: No





- Hematic


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


Hx Sickle Cell Disease: No





- Other Systems


Hx Alcohol Use: No


Hx Substance Use: No


Hx Cancer: No


Hx Obesity: Yes (BMI 35.2)

## 2019-12-07 NOTE — PROGRESS NOTE
Assessment and Plan





- Patient Problems


(1) 26 weeks gestation of pregnancy


Onset Date: 19   Current Visit: Yes   Status: Acute   


Plan to address problem: 


A:  IUP @ 27 0/7 weeks


      Twin gestation (Di/Di) - Breech/Transverse


      Previous C Section


      Shortened cervix


      Advanced cervical dilatation





 P:  Continue with present management as per [facility] recommendations


      Will discontinue IV Magnesium sulfate and start Indocin


      S/P IM steroids and IV Ampicillin


      Appreciate NICU and [facility] consultation


      Will continue with conservative management and deliver by Repeat C Section

if labor resumes








(2) Twin gestation, dichorionic diamniotic


Onset Date: 19   Current Visit: Yes   Status: Acute   


Qualifiers: 


   Trimester: third trimester   Qualified Code(s): O30.043 - Twin pregnancy, 

dichorionic/diamniotic, third trimester   





(3) Previous  section complicating pregnancy


Onset Date: 19   Current Visit: Yes   Status: Acute   





(4) Cervical shortening affecting pregnancy in third trimester


Onset Date: 19   Current Visit: Yes   Status: Acute   





Subjective





- Subjective


Date of service: 19


Principal diagnosis: IUP @ 27 0/7 weeks;  Twin gestation; PTL


Interval history: 





Pt is a 25yo BF  EDC 3/7/20;  EGA 27 0/7 weeks presented to L&D from the 

office for evaluation of shortened cervix.  She received prenatal care at 

[facility] since 13 weeks and co-managed by [facility] for Twin gestation

(Di/Di), shortened cervix and Alpha Thalassemia carrier. She denied 

contractions, but cervix was 3/50/-3 in the office per CNM. Her cervical  

dilatation was arrested at 5/60/-3 on IV Magnesium sulfate, IV Ampicillin and 

she completed her 2nd dose of steroids, and not currently keara.


Patient reports: fetal movement normal, no new complaints, no loss of fluid, no 

vaginal bleeding, no contractions





Objective





- Vital Signs


Vital Signs: 


                               Vital Signs - 12hr











  19





  20:07 20:12 20:17


 


Pulse Rate 115 H 109 H 113 H


 


Blood Pressure   


 


O2 Sat by Pulse 99 100 100





Oximetry   














  19





  20:22 20:27 20:32


 


Pulse Rate 113 H 110 H 115 H


 


Blood Pressure   


 


O2 Sat by Pulse 99 100 100





Oximetry   














  19





  20:37 20:42 20:47


 


Pulse Rate 112 H 113 H 120 H


 


Blood Pressure   


 


O2 Sat by Pulse 100 100 100





Oximetry   














  19





  20:49 20:52 20:57


 


Pulse Rate 109 H 111 H 106 H


 


Blood Pressure 102/53  


 


O2 Sat by Pulse  100 99





Oximetry   














  19





  21:02 21:07 21:12


 


Pulse Rate 119 H 116 H 107 H


 


Blood Pressure   


 


O2 Sat by Pulse 99 100 97





Oximetry   














  19





  21:17 21:22 21:25


 


Pulse Rate 112 H 111 H 109 H


 


Blood Pressure   91/51


 


O2 Sat by Pulse 100 100 





Oximetry   














  19





  21:27 21:32 21:37


 


Pulse Rate 111 H 110 H 110 H


 


Blood Pressure   


 


O2 Sat by Pulse 99 96 96





Oximetry   














  19





  21:42 21:47 21:52


 


Pulse Rate 115 H 107 H 116 H


 


Blood Pressure   


 


O2 Sat by Pulse 99 98 100





Oximetry   














  19





  21:57 22:02 22:07


 


Pulse Rate 110 H 110 H 116 H


 


Blood Pressure   


 


O2 Sat by Pulse 99 98 100





Oximetry   














  19





  22:12 22:17 22:22


 


Pulse Rate 114 H 109 H 115 H


 


Blood Pressure   


 


O2 Sat by Pulse 100 98 98





Oximetry   














  19





  22:25 22:27 22:32


 


Pulse Rate 107 H 107 H 103 H


 


Blood Pressure 99/51  


 


O2 Sat by Pulse  97 100





Oximetry   














  19





  22:37 22:42 22:47


 


Pulse Rate 106 H 106 H 104 H


 


Blood Pressure   


 


O2 Sat by Pulse 98 96 97





Oximetry   














  19





  22:52 22:57 23:02


 


Pulse Rate 110 H 115 H 100 H


 


Blood Pressure   


 


O2 Sat by Pulse 99 100 99





Oximetry   














  19





  23:07 23:12 23:17


 


Pulse Rate 99 H 109 H 109 H


 


Blood Pressure   


 


O2 Sat by Pulse 96 97 100





Oximetry   














  19





  23:22 23:27 23:32


 


Pulse Rate 101 H 101 H 100 H


 


Blood Pressure   


 


O2 Sat by Pulse 100 99 98





Oximetry   














  19





  23:37 23:42 23:47


 


Pulse Rate 100 H 102 H 101 H


 


Blood Pressure   


 


O2 Sat by Pulse 99 99 99





Oximetry   














  19





  23:52 23:57 00:02


 


Pulse Rate 102 H 100 H 100 H


 


Blood Pressure   


 


O2 Sat by Pulse 99 98 98





Oximetry   














  19





  00:07 00:12 00:17


 


Pulse Rate 101 H 103 H 98 H


 


Blood Pressure   


 


O2 Sat by Pulse 97 99 99





Oximetry   














  19





  00:22 00:25 00:27


 


Pulse Rate 109 H 97 H 112 H


 


Blood Pressure  112/53 


 


O2 Sat by Pulse 98  99





Oximetry   














  19





  00:32 00:37 00:41


 


Pulse Rate 95 H 92 H 98 H


 


Blood Pressure   


 


O2 Sat by Pulse 98 98 91





Oximetry   














  19





  00:42 00:47 00:52


 


Pulse Rate 89 96 H 95 H


 


Blood Pressure   


 


O2 Sat by Pulse 98 97 99





Oximetry   














  19





  00:57 01:02 01:07


 


Pulse Rate 98 H 93 H 91 H


 


Blood Pressure   


 


O2 Sat by Pulse 98 100 100





Oximetry   














  19





  01:12 01:17 01:22


 


Pulse Rate 94 H 101 H 96 H


 


Blood Pressure   


 


O2 Sat by Pulse 99 100 100





Oximetry   














  19





  01:27 01:32 01:37


 


Pulse Rate 101 H 89 92 H


 


Blood Pressure   


 


O2 Sat by Pulse 99 100 100





Oximetry   














  19





  01:42 01:47 01:52


 


Pulse Rate 94 H 96 H 102 H


 


Blood Pressure   


 


O2 Sat by Pulse 100 100 100





Oximetry   














  19





  01:57 02:02 02:07


 


Pulse Rate 93 H 93 H 97 H


 


Blood Pressure   


 


O2 Sat by Pulse 99 98 97





Oximetry   














  19





  02:12 02:17 02:22


 


Pulse Rate 98 H 93 H 98 H


 


Blood Pressure   


 


O2 Sat by Pulse 95 99 98





Oximetry   














  19





  02:27 02:32 02:37


 


Pulse Rate 97 H 93 H 94 H


 


Blood Pressure   


 


O2 Sat by Pulse 97 99 98





Oximetry   














  19





  02:42 02:47 02:52


 


Pulse Rate 91 H 93 H 103 H


 


Blood Pressure   


 


O2 Sat by Pulse 97 98 99





Oximetry   














  19





  02:57 03:02 03:07


 


Pulse Rate 94 H 92 H 94 H


 


Blood Pressure   


 


O2 Sat by Pulse 100 99 97





Oximetry   














  19





  03:12 03:17 03:18


 


Pulse Rate 99 H 100 H 102 H


 


Blood Pressure   


 


O2 Sat by Pulse 99 95 92





Oximetry   














  19





  03:22 03:27 03:32


 


Pulse Rate 99 H 105 H 104 H


 


Blood Pressure   


 


O2 Sat by Pulse 96 100 100





Oximetry   














  19





  03:37 03:42 03:47


 


Pulse Rate 110 H 109 H 101 H


 


Blood Pressure   


 


O2 Sat by Pulse 99 100 99





Oximetry   














  19





  03:52 03:57 04:02


 


Pulse Rate 104 H 100 H 100 H


 


Blood Pressure   


 


O2 Sat by Pulse 99 100 99





Oximetry   














  19





  04:07 04:12 04:17


 


Pulse Rate 101 H 102 H 101 H


 


Blood Pressure   


 


O2 Sat by Pulse 99 99 100





Oximetry   














  19





  04:22 04:27 04:32


 


Pulse Rate 102 H 101 H 102 H


 


Blood Pressure   


 


O2 Sat by Pulse 99 98 100





Oximetry   














  19





  04:37 04:42 04:47


 


Pulse Rate 98 H 107 H 110 H


 


Blood Pressure   


 


O2 Sat by Pulse 98 100 100





Oximetry   














  19





  04:52 04:57 05:02


 


Pulse Rate 104 H 103 H 127 H


 


Blood Pressure   


 


O2 Sat by Pulse 99 99 98





Oximetry   














  19





  05:07 05:12 05:17


 


Pulse Rate 119 H 106 H 109 H


 


Blood Pressure   


 


O2 Sat by Pulse 100 97 99





Oximetry   














  19





  05:22 05:27 05:32


 


Pulse Rate 100 H 107 H 108 H


 


Blood Pressure   


 


O2 Sat by Pulse 98 99 98





Oximetry   














  19





  05:37 05:42 05:47


 


Pulse Rate 110 H 108 H 107 H


 


Blood Pressure   


 


O2 Sat by Pulse 99 99 100





Oximetry   














  19





  05:52 05:57 06:02


 


Pulse Rate 104 H 110 H 106 H


 


Blood Pressure   


 


O2 Sat by Pulse 99 100 100





Oximetry   














  19





  06:07 06:12 06:17


 


Pulse Rate 105 H 109 H 107 H


 


Blood Pressure   


 


O2 Sat by Pulse 98 100 100





Oximetry   














  19





  06:22 06:24 06:27


 


Pulse Rate 105 H 106 H 108 H


 


Blood Pressure  97/56 


 


O2 Sat by Pulse 99  99





Oximetry   














  19





  06:32 06:37 06:42


 


Pulse Rate 108 H 108 H 108 H


 


Blood Pressure   


 


O2 Sat by Pulse 99 100 99





Oximetry   














  19





  06:47 06:52 06:57


 


Pulse Rate 105 H 105 H 107 H


 


Blood Pressure   


 


O2 Sat by Pulse 100 100 100





Oximetry   














  19





  07:02 07:07 07:12


 


Pulse Rate 103 H 105 H 105 H


 


Blood Pressure   


 


O2 Sat by Pulse 100 99 95





Oximetry   














  19





  07:17 07:22 07:24


 


Pulse Rate 102 H 106 H 103 H


 


Blood Pressure   121/78


 


O2 Sat by Pulse 99 98 





Oximetry   














  19





  07:27 07:32 07:37


 


Pulse Rate 110 H 116 H 103 H


 


Blood Pressure   


 


O2 Sat by Pulse 100 100 99





Oximetry   














  19





  07:42 07:47 07:52


 


Pulse Rate 101 H 101 H 105 H


 


Blood Pressure   


 


O2 Sat by Pulse 99 99 100





Oximetry   














  19





  07:57 08:02


 


Pulse Rate 110 H 100 H


 


Blood Pressure  


 


O2 Sat by Pulse 99 99





Oximetry  














- Exam


Breasts: deferred


Abdomen: Present: normal appearance


Uterus: Present: normal


FHR: category 1


Uterine Contraction Monitor Mode: External


Cervical Dilatation: 5


Cervical Effacement Percentage: 60


Fetal station: -3


Uterine Contraction Pattern: Absent





- Labs


Labs: 


                                  Abnormal Labs











  19





  16:46 16:46 00:51


 


Hgb  10.0 L  


 


MCH  26 L  


 


RDW  16.1 H  


 


Mono % (Auto)  8.5 H  


 


Sodium   135 L 


 


Potassium   3.4 L 


 


Carbon Dioxide   19 L 


 


Creatinine   0.5 L 


 


Magnesium    5.50 H


 


Albumin   3.8 L 














  19





  06:06 11:55 19:53


 


Hgb   


 


MCH   


 


RDW   


 


Mono % (Auto)   


 


Sodium   


 


Potassium   


 


Carbon Dioxide   


 


Creatinine   


 


Magnesium  6.00 H  6.50 H  5.90 H


 


Albumin   














  19





  00:57 05:41


 


Hgb  


 


MCH  


 


RDW  


 


Mono % (Auto)  


 


Sodium  


 


Potassium  


 


Carbon Dioxide  


 


Creatinine  


 


Magnesium  6.00 H  6.30 H


 


Albumin  








                         Laboratory Results - last 24 hr











  19





  11:55 19:53 00:57


 


Magnesium  6.50 H  5.90 H  6.00 H














  19





  05:41


 


Magnesium  6.30 H














- Results


US- obstetric: report reviewed (Twin A - Breech; BPP ; Twin B - Transverse; 

BPP )

## 2019-12-07 NOTE — ANESTHESIA DAY OF SURGERY
Anesthesia Day of Surgery





- Day of Surgery


Patient Examined: Yes


Patient H&P Reviewed: Yes


Patient is NPO: Yes


Beta Blockers: No


Cardiac Clearance: No


Pulmonary Clearance: No


Eitan's Test: N/A

## 2019-12-08 RX ADMIN — INDOMETHACIN SCH MG: 25 CAPSULE ORAL at 21:20

## 2019-12-08 RX ADMIN — INDOMETHACIN SCH MG: 25 CAPSULE ORAL at 11:37

## 2019-12-08 RX ADMIN — AMPICILLIN SODIUM SCH MLS/HR: 1 INJECTION, POWDER, FOR SOLUTION INTRAMUSCULAR; INTRAVENOUS at 11:41

## 2019-12-08 RX ADMIN — ACETAMINOPHEN PRN MG: 325 TABLET ORAL at 13:48

## 2019-12-08 RX ADMIN — Medication SCH EACH: at 08:59

## 2019-12-08 RX ADMIN — AMPICILLIN SODIUM SCH MLS/HR: 1 INJECTION, POWDER, FOR SOLUTION INTRAMUSCULAR; INTRAVENOUS at 21:45

## 2019-12-08 RX ADMIN — AMPICILLIN SODIUM SCH MLS/HR: 1 INJECTION, POWDER, FOR SOLUTION INTRAMUSCULAR; INTRAVENOUS at 15:45

## 2019-12-08 RX ADMIN — SODIUM CHLORIDE, SODIUM LACTATE, POTASSIUM CHLORIDE, AND CALCIUM CHLORIDE SCH MLS/HR: .6; .31; .03; .02 INJECTION, SOLUTION INTRAVENOUS at 05:53

## 2019-12-08 NOTE — PROGRESS NOTE
Assessment and Plan





- Patient Problems


(1) 26 weeks gestation of pregnancy


Onset Date: 19   Current Visit: Yes   Status: Resolved   


Plan to address problem: 


A:  IUP @ 27 1/7 weeks


      Twin gestation (Di/Di) - Breech/Transverse


      Previous C Section


      Shortened cervix


      Advanced cervical dilatation





 P:  Continue with present management as per [facility] recommendations


      Will continue Indocin x 3 days


      S/P Magnesium sulfate


      S/P IM steroids and IV Ampicillin


      Appreciate NICU and [facility] consultation


      Will continue with conservative management and deliver by Repeat C Section

if labor resumes








(2) Twin gestation, dichorionic diamniotic


Onset Date: 19   Current Visit: Yes   Status: Acute   


Qualifiers: 


   Trimester: third trimester   Qualified Code(s): O30.043 - Twin pregnancy, 

dichorionic/diamniotic, third trimester   





(3) Previous  section complicating pregnancy


Onset Date: 19   Current Visit: Yes   Status: Acute   





(4) Cervical shortening affecting pregnancy in third trimester


Onset Date: 19   Current Visit: Yes   Status: Acute   





(5) 27 weeks gestation of pregnancy


Onset Date: 19   Current Visit: Yes   Status: Acute   





Subjective





- Subjective


Date of service: 19


Principal diagnosis: IUP @ 27 1/7 weeks;  Twin gestation; PTL


Interval history: 





Pt is a 23yo BF  EDC 3/7/20;  EGA 27 1/7 weeks presented to L&D from the 

office for evaluation of shortened cervix.  She received prenatal care at 

[facility] since 13 weeks and co-managed by [facility] for Twin gestation

(Di/Di), shortened cervix and Alpha Thalassemia carrier. She denied 

contractions, but cervix was 3/50/-3 in the office per CNM. Her cervical  

dilatation was arrested at 5/60/-3 on IV Magnesium sulfate, IV Ampicillin and 

she completed her 2nd dose of steroids, and currently keara irregularly on

Indocin 25mg Q6H.


Patient reports: fetal movement normal, no new complaints, no loss of fluid, no 

vaginal bleeding, no contractions





Objective





- Vital Signs


Vital Signs: 


                               Vital Signs - 12hr











  19





  00:16 00:21 00:26


 


Pulse Rate 94 H 98 H 100 H


 


Blood Pressure   


 


O2 Sat by Pulse 95 95 94





Oximetry   














  12/08/19 12/08/19 12/08/19





  00:31 00:36 00:41


 


Pulse Rate 99 H 98 H 100 H


 


Blood Pressure   


 


O2 Sat by Pulse 95 95 95





Oximetry   














  19





  00:46 00:51 00:54


 


Pulse Rate 108 H 97 H 96 H


 


Blood Pressure   


 


O2 Sat by Pulse 96 93 92





Oximetry   














  19





  00:56 01:00 01:01


 


Pulse Rate 94 H 98 H 104 H


 


Blood Pressure   


 


O2 Sat by Pulse 90 91 92





Oximetry   














  19





  01:06 01:11 01:16


 


Pulse Rate 97 H 95 H 93 H


 


Blood Pressure   


 


O2 Sat by Pulse 92 96 94





Oximetry   














  19





  01:21 01:26 01:31


 


Pulse Rate 92 H 94 H 95 H


 


Blood Pressure   


 


O2 Sat by Pulse 94 94 94





Oximetry   














  19





  01:36 01:41 01:46


 


Pulse Rate 98 H 105 H 93 H


 


Blood Pressure   


 


O2 Sat by Pulse 94 96 95





Oximetry   














  19





  01:51 01:56 02:01


 


Pulse Rate 98 H 95 H 98 H


 


Blood Pressure   


 


O2 Sat by Pulse 96 95 97





Oximetry   














  19





  02:06 02:11 02:16


 


Pulse Rate 98 H 101 H 95 H


 


Blood Pressure   


 


O2 Sat by Pulse 96 94 94





Oximetry   














  19





  02:21 02:26 02:31


 


Pulse Rate 94 H 96 H 98 H


 


Blood Pressure   


 


O2 Sat by Pulse 94 94 94





Oximetry   














  19





  02:36 02:41 02:46


 


Pulse Rate 97 H 97 H 109 H


 


Blood Pressure   


 


O2 Sat by Pulse 95 95 97





Oximetry   














  19





  02:51 02:56 03:01


 


Pulse Rate 94 H 100 H 100 H


 


Blood Pressure   


 


O2 Sat by Pulse 91 92 92





Oximetry   














  19





  03:06 03:11 03:16


 


Pulse Rate 98 H 97 H 107 H


 


Blood Pressure   


 


O2 Sat by Pulse 94 94 96





Oximetry   














  19





  03:21 03:26 03:31


 


Pulse Rate 97 H 106 H 98 H


 


Blood Pressure   


 


O2 Sat by Pulse 94 96 97





Oximetry   














  19





  03:32 03:40 03:44


 


Pulse Rate 108 H  134 H


 


Blood Pressure   


 


O2 Sat by Pulse 92 86 89





Oximetry   














  19





  03:45 03:49 03:54


 


Pulse Rate 97 H 102 H 99 H


 


Blood Pressure 89/48  


 


O2 Sat by Pulse  97 96





Oximetry   














  19





  03:59 04:04 04:09


 


Pulse Rate 97 H 99 H 94 H


 


Blood Pressure   


 


O2 Sat by Pulse 96 97 97





Oximetry   














  19





  04:14 04:19 04:24


 


Pulse Rate 97 H 101 H 94 H


 


Blood Pressure  77/39 


 


O2 Sat by Pulse 97 98 97





Oximetry   














  19





  04:29 04:34 04:39


 


Pulse Rate 103 H 103 H 99 H


 


Blood Pressure   


 


O2 Sat by Pulse 98 97 98





Oximetry   














  19





  04:44 04:49 04:54


 


Pulse Rate 101 H 107 H 109 H


 


Blood Pressure   


 


O2 Sat by Pulse 98 97 98





Oximetry   














  19





  04:59 05:04 05:09


 


Pulse Rate 102 H 101 H 100 H


 


Blood Pressure   


 


O2 Sat by Pulse 97 98 97





Oximetry   














  19





  05:14 05:19 05:24


 


Pulse Rate 97 H 103 H 104 H


 


Blood Pressure   


 


O2 Sat by Pulse 97 97 97





Oximetry   














  19





  05:29 05:34 05:39


 


Pulse Rate 101 H 106 H 107 H


 


Blood Pressure   


 


O2 Sat by Pulse 96 97 97





Oximetry   














  19





  05:44 05:49 05:52


 


Pulse Rate 101 H 101 H 98 H


 


Blood Pressure   94/50


 


O2 Sat by Pulse 96 97 





Oximetry   














  19





  05:54 05:59 06:04


 


Pulse Rate 97 H 98 H 99 H


 


Blood Pressure   


 


O2 Sat by Pulse 97 96 94





Oximetry   














  19





  06:14 06:19 06:24


 


Pulse Rate 99 H 102 H 95 H


 


Blood Pressure   


 


O2 Sat by Pulse 94 94 93





Oximetry   














  19





  06:25 06:29 06:34


 


Pulse Rate 102 H 100 H 103 H


 


Blood Pressure   


 


O2 Sat by Pulse 92 94 94





Oximetry   














  19





  06:39 06:44 06:49


 


Pulse Rate 100 H 110 H 101 H


 


Blood Pressure   


 


O2 Sat by Pulse 94 97 94





Oximetry   














  19





  06:54 06:59 07:00


 


Pulse Rate 96 H 98 H 95 H


 


Blood Pressure   


 


O2 Sat by Pulse 91 93 92





Oximetry   














  19





  07:04 07:09 07:14


 


Pulse Rate 111 H 107 H 100 H


 


Blood Pressure   


 


O2 Sat by Pulse 96 96 96





Oximetry   














  19





  07:19 07:24 07:29


 


Pulse Rate 109 H 102 H 108 H


 


Blood Pressure   


 


O2 Sat by Pulse 96 97 97





Oximetry   














  19





  07:33 07:34 07:39


 


Pulse Rate 107 H 104 H 101 H


 


Blood Pressure 91/42  


 


O2 Sat by Pulse  97 97





Oximetry   














  19





  07:44 07:49 07:54


 


Pulse Rate 103 H 102 H 104 H


 


Blood Pressure   


 


O2 Sat by Pulse 97 97 97





Oximetry   














  19





  07:59 08:04 08:09


 


Pulse Rate 100 H 96 H 105 H


 


Blood Pressure   


 


O2 Sat by Pulse 96 96 96





Oximetry   














  19





  08:14 08:19 08:24


 


Pulse Rate 99 H 106 H 99 H


 


Blood Pressure   


 


O2 Sat by Pulse 97 96 95





Oximetry   














  19





  08:29 08:34 08:39


 


Pulse Rate 99 H 98 H 99 H


 


Blood Pressure   


 


O2 Sat by Pulse 95 95 94





Oximetry   














  12/19





  08:44 08:49 08:54


 


Pulse Rate 101 H 106 H 108 H


 


Blood Pressure   


 


O2 Sat by Pulse 94 97 96





Oximetry   














  19





  08:59 09:04 09:09


 


Pulse Rate 102 H 107 H 109 H


 


Blood Pressure   


 


O2 Sat by Pulse 96 96 97





Oximetry   














  19





  09:14 09:19 09:24


 


Pulse Rate 110 H 116 H 112 H


 


Blood Pressure   


 


O2 Sat by Pulse 97 96 97





Oximetry   














  19





  09:29 09:34 09:39


 


Pulse Rate 112 H 106 H 116 H


 


Blood Pressure   


 


O2 Sat by Pulse 96 96 97





Oximetry   














  19





  09:44 09:49 09:54


 


Pulse Rate 118 H 109 H 109 H


 


Blood Pressure   


 


O2 Sat by Pulse 96 96 96





Oximetry   














  19





  09:59 10:04 10:09


 


Pulse Rate 124 H 114 H 111 H


 


Blood Pressure   


 


O2 Sat by Pulse 97 97 97





Oximetry   














  19





  10:14 10:19 10:24


 


Pulse Rate 109 H 111 H 106 H


 


Blood Pressure   


 


O2 Sat by Pulse 97 97 97





Oximetry   














  19





  10:29 10:34 10:39


 


Pulse Rate 110 H 109 H 113 H


 


Blood Pressure   


 


O2 Sat by Pulse 98 97 98





Oximetry   














  19





  10:45 10:50 11:00


 


Pulse Rate 119 H 114 H 


 


Blood Pressure   


 


O2 Sat by Pulse 98 98 81 L





Oximetry   














  19





  11:01 11:05 11:09


 


Pulse Rate 69 68 121 H


 


Blood Pressure   


 


O2 Sat by Pulse 79 L 81 L 80 L





Oximetry   














  19





  11:10 11:16 11:21


 


Pulse Rate 112 H 116 H 72


 


Blood Pressure   


 


O2 Sat by Pulse 83 L 86 82 L





Oximetry   














  19





  11:22 11:27 11:32


 


Pulse Rate 74 117 H 109 H


 


Blood Pressure   


 


O2 Sat by Pulse 80 L 97 97





Oximetry   














  19





  11:37 11:42 11:47


 


Pulse Rate 106 H 106 H 107 H


 


Blood Pressure   


 


O2 Sat by Pulse 97 97 96





Oximetry   














  19





  11:52 11:57 12:02


 


Pulse Rate 104 H 108 H 109 H


 


Blood Pressure   


 


O2 Sat by Pulse 95 95 94





Oximetry   














  19





  12:07


 


Pulse Rate 105 H


 


Blood Pressure 


 


O2 Sat by Pulse 94





Oximetry 














- Exam


Breasts: deferred


Abdomen: Present: normal appearance, soft


Uterus: Present: normal


FHR: category 1


Uterine Contraction Monitor Mode: External


Uterine Contraction Pattern: Irregular


Uterine Contraction Intensity: Mild





- Labs


Labs: 


                                  Abnormal Labs











  19





  16:46 16:46 00:51


 


Hgb  10.0 L  


 


MCH  26 L  


 


RDW  16.1 H  


 


Mono % (Auto)  8.5 H  


 


Sodium   135 L 


 


Potassium   3.4 L 


 


Carbon Dioxide   19 L 


 


Creatinine   0.5 L 


 


Magnesium    5.50 H


 


Albumin   3.8 L 














  19





  06:06 11:55 19:53


 


Hgb   


 


MCH   


 


RDW   


 


Mono % (Auto)   


 


Sodium   


 


Potassium   


 


Carbon Dioxide   


 


Creatinine   


 


Magnesium  6.00 H  6.50 H  5.90 H


 


Albumin   














  19





  00:57 05:41


 


Hgb  


 


MCH  


 


RDW  


 


Mono % (Auto)  


 


Sodium  


 


Potassium  


 


Carbon Dioxide  


 


Creatinine  


 


Magnesium  6.00 H  6.30 H


 


Albumin

## 2019-12-09 RX ADMIN — Medication SCH EACH: at 12:47

## 2019-12-09 RX ADMIN — INDOMETHACIN SCH MG: 25 CAPSULE ORAL at 08:22

## 2019-12-09 RX ADMIN — Medication SCH: at 21:29

## 2019-12-09 RX ADMIN — INDOMETHACIN SCH MG: 25 CAPSULE ORAL at 19:00

## 2019-12-09 RX ADMIN — INDOMETHACIN SCH MG: 25 CAPSULE ORAL at 03:08

## 2019-12-09 RX ADMIN — SODIUM CHLORIDE, SODIUM LACTATE, POTASSIUM CHLORIDE, AND CALCIUM CHLORIDE SCH MLS/HR: .6; .31; .03; .02 INJECTION, SOLUTION INTRAVENOUS at 14:28

## 2019-12-09 RX ADMIN — INDOMETHACIN SCH MG: 25 CAPSULE ORAL at 14:29

## 2019-12-09 RX ADMIN — SODIUM CHLORIDE, SODIUM LACTATE, POTASSIUM CHLORIDE, AND CALCIUM CHLORIDE SCH MLS/HR: .6; .31; .03; .02 INJECTION, SOLUTION INTRAVENOUS at 01:43

## 2019-12-09 RX ADMIN — AMPICILLIN SODIUM SCH MLS/HR: 1 INJECTION, POWDER, FOR SOLUTION INTRAMUSCULAR; INTRAVENOUS at 03:05

## 2019-12-09 NOTE — PROGRESS NOTE
Assessment and Plan





- Patient Problems


(1) 26 weeks gestation of pregnancy


Onset Date: 19   Current Visit: Yes   Status: Resolved   





(2) Twin gestation, dichorionic diamniotic


Onset Date: 19   Current Visit: Yes   Status: Acute   


Qualifiers: 


   Trimester: third trimester   Qualified Code(s): O30.043 - Twin pregnancy, 

dichorionic/diamniotic, third trimester   





(3) Previous  section complicating pregnancy


Onset Date: 19   Current Visit: Yes   Status: Acute   





(4) Cervical shortening affecting pregnancy in third trimester


Onset Date: 19   Current Visit: Yes   Status: Acute   





(5) 27 weeks gestation of pregnancy


Onset Date: 19   Current Visit: Yes   Status: Acute   


Plan to address problem: 


A:  IUP @ 27 2/7 weeks


      Twin gestation (Di/Di) - Breech/Transverse


      Previous C Section


      Shortened cervix


      Advanced cervical dilatation





 P:  Continue with present management as per [facility] recommendations


      Will continue Indocin x 3 days


      S/P Magnesium sulfate


      S/P IM steroids and IV Ampicillin


      Appreciate NICU and [facility] consultation


      Will continue with conservative management and deliver by Repeat C Section

if labor resumes











Subjective





- Subjective


Date of service: 19


Principal diagnosis: IUP @ 27 2/7 weeks;  Twin gestation; PTL


Interval history: 





Pt is a 25yo BF  EDC 3/7/20;  EGA 27 2/7 weeks presented to L&D from the 

office for evaluation of shortened cervix.  She received prenatal care at 

[facility] since 13 weeks and co-managed by [facility] for Twin gestation

(Di/Di), shortened cervix and Alpha Thalassemia carrier. She denied 

contractions, but cervix was 3/50/-3 in the office per CNM. Her cervical  

dilatation was arrested at 5/60/-3 on IV Magnesium sulfate, IV Ampicillin and 

she completed her 2nd dose of steroids, and currently not keara on Indocin

25mg Q6H. +FM.  No bleeding or ROM.


Patient reports: fetal movement normal, no new complaints, no loss of fluid, no 

vaginal bleeding, no contractions





Objective





- Vital Signs


Vital Signs: 


                               Vital Signs - 12hr











  19





  23:11 23:15 23:16


 


Temperature   


 


Pulse Rate 97 H 93 H 89


 


Respiratory   





Rate   


 


Blood Pressure   


 


Blood Pressure   





[Left]   


 


O2 Sat by Pulse 95 92 96





Oximetry   














  19





  23:21 23:26 23:31


 


Temperature   


 


Pulse Rate 94 H 95 H 94 H


 


Respiratory   





Rate   


 


Blood Pressure   


 


Blood Pressure   





[Left]   


 


O2 Sat by Pulse 95 94 94





Oximetry   














  19





  23:36 23:41 23:46


 


Temperature   


 


Pulse Rate 95 H 92 H 99 H


 


Respiratory   





Rate   


 


Blood Pressure   


 


Blood Pressure   





[Left]   


 


O2 Sat by Pulse 94 96 93





Oximetry   














  19





  23:51 23:56 00:01


 


Temperature   


 


Pulse Rate 89 92 H 90


 


Respiratory   





Rate   


 


Blood Pressure   


 


Blood Pressure   





[Left]   


 


O2 Sat by Pulse 95 94 94





Oximetry   














  19





  00:06 00:11 00:16


 


Temperature   


 


Pulse Rate 92 H 94 H 92 H


 


Respiratory   





Rate   


 


Blood Pressure   


 


Blood Pressure   





[Left]   


 


O2 Sat by Pulse 96 96 95





Oximetry   














  19





  00:21 00:26 00:31


 


Temperature   


 


Pulse Rate 107 H 103 H 96 H


 


Respiratory   





Rate   


 


Blood Pressure   


 


Blood Pressure   





[Left]   


 


O2 Sat by Pulse 95 97 96





Oximetry   














  19





  00:36 00:41 00:46


 


Temperature   


 


Pulse Rate 97 H 92 H 100 H


 


Respiratory   





Rate   


 


Blood Pressure   


 


Blood Pressure   





[Left]   


 


O2 Sat by Pulse 96 95 96





Oximetry   














  19





  00:51 00:56 01:01


 


Temperature   


 


Pulse Rate 95 H 101 H 100 H


 


Respiratory   





Rate   


 


Blood Pressure   


 


Blood Pressure   





[Left]   


 


O2 Sat by Pulse 95 95 96





Oximetry   














  19





  01:06 01:11 01:16


 


Temperature   


 


Pulse Rate 96 H 97 H 105 H


 


Respiratory   





Rate   


 


Blood Pressure   


 


Blood Pressure   





[Left]   


 


O2 Sat by Pulse 94 94 97





Oximetry   














  19





  01:19 01:24 01:27


 


Temperature   


 


Pulse Rate 100 H 73 187 H


 


Respiratory   





Rate   


 


Blood Pressure   


 


Blood Pressure   





[Left]   


 


O2 Sat by Pulse 90 79 L 73 L





Oximetry   














  19





  01:32 01:37 01:42


 


Temperature   


 


Pulse Rate 90 93 H 87


 


Respiratory   





Rate   


 


Blood Pressure   


 


Blood Pressure   





[Left]   


 


O2 Sat by Pulse 97 96 96





Oximetry   














  19





  01:47 01:52 01:57


 


Temperature   


 


Pulse Rate 91 H 92 H 92 H


 


Respiratory   





Rate   


 


Blood Pressure   


 


Blood Pressure   





[Left]   


 


O2 Sat by Pulse 96 95 95





Oximetry   














  19





  02:02 02:03 02:07


 


Temperature   


 


Pulse Rate 94 H 95 H 91 H


 


Respiratory   





Rate   


 


Blood Pressure   


 


Blood Pressure   





[Left]   


 


O2 Sat by Pulse 93 91 96





Oximetry   














  19





  02:12 02:17 02:22


 


Temperature   


 


Pulse Rate 96 H 95 H 94 H


 


Respiratory   





Rate   


 


Blood Pressure   


 


Blood Pressure   





[Left]   


 


O2 Sat by Pulse 95 96 95





Oximetry   














  19





  02:27 02:32 02:37


 


Temperature   


 


Pulse Rate 97 H 98 H 95 H


 


Respiratory   





Rate   


 


Blood Pressure   


 


Blood Pressure   





[Left]   


 


O2 Sat by Pulse 95 95 95





Oximetry   














  19





  02:42 02:47 02:52


 


Temperature   


 


Pulse Rate 98 H 92 H 93 H


 


Respiratory   





Rate   


 


Blood Pressure   


 


Blood Pressure   





[Left]   


 


O2 Sat by Pulse 95 95 95





Oximetry   














  19





  02:57 03:02 03:05


 


Temperature   98.0 F


 


Pulse Rate 93 H 96 H 98 H


 


Respiratory   18





Rate   


 


Blood Pressure   


 


Blood Pressure   96/55





[Left]   


 


O2 Sat by Pulse 94 96 95





Oximetry   














  19





  03:06 03:07 03:12


 


Temperature   


 


Pulse Rate 100 H 97 H 97 H


 


Respiratory   





Rate   


 


Blood Pressure 87/47  


 


Blood Pressure   





[Left]   


 


O2 Sat by Pulse  94 96





Oximetry   














  19





  03:17 03:22 03:27


 


Temperature   


 


Pulse Rate 95 H 93 H 91 H


 


Respiratory   





Rate   


 


Blood Pressure   


 


Blood Pressure   





[Left]   


 


O2 Sat by Pulse 95 94 95





Oximetry   














  19





  03:32 03:37 03:42


 


Temperature   


 


Pulse Rate 96 H 88 95 H


 


Respiratory   





Rate   


 


Blood Pressure   


 


Blood Pressure   





[Left]   


 


O2 Sat by Pulse 93 95 94





Oximetry   














  19





  03:47 03:52 03:57


 


Temperature   


 


Pulse Rate 94 H 96 H 95 H


 


Respiratory   





Rate   


 


Blood Pressure   


 


Blood Pressure   





[Left]   


 


O2 Sat by Pulse 95 94 89





Oximetry   














  19





  04:02 04:04 04:07


 


Temperature   


 


Pulse Rate 93 H 90 83


 


Respiratory   





Rate   


 


Blood Pressure   


 


Blood Pressure   





[Left]   


 


O2 Sat by Pulse 96 92 98





Oximetry   














  19





  04:10 04:12 04:16


 


Temperature   


 


Pulse Rate 88 92 H 96 H


 


Respiratory   





Rate   


 


Blood Pressure   


 


Blood Pressure   





[Left]   


 


O2 Sat by Pulse 92 98 89





Oximetry   














  19





  04:17 04:22 04:27


 


Temperature   


 


Pulse Rate 87 90 98 H


 


Respiratory   





Rate   


 


Blood Pressure   


 


Blood Pressure   





[Left]   


 


O2 Sat by Pulse 95 95 97





Oximetry   














  19





  04:32 04:37 04:42


 


Temperature   


 


Pulse Rate 98 H 97 H 87


 


Respiratory   





Rate   


 


Blood Pressure   


 


Blood Pressure   





[Left]   


 


O2 Sat by Pulse 97 96 95





Oximetry   














  19





  04:47 04:52 04:57


 


Temperature   


 


Pulse Rate 96 H 92 H 92 H


 


Respiratory   





Rate   


 


Blood Pressure   


 


Blood Pressure   





[Left]   


 


O2 Sat by Pulse 95 95 96





Oximetry   














  19





  05:02 05:07 05:12


 


Temperature   


 


Pulse Rate 99 H 104 H 100 H


 


Respiratory   





Rate   


 


Blood Pressure   


 


Blood Pressure   





[Left]   


 


O2 Sat by Pulse 95 98 96





Oximetry   














  19





  05:16 05:18 05:22


 


Temperature   


 


Pulse Rate  105 H 


 


Respiratory   





Rate   


 


Blood Pressure   


 


Blood Pressure   





[Left]   


 


O2 Sat by Pulse 85 73 L 75 L





Oximetry   














  19





  05:34 05:35 05:39


 


Temperature   


 


Pulse Rate  83 


 


Respiratory   





Rate   


 


Blood Pressure   


 


Blood Pressure   





[Left]   


 


O2 Sat by Pulse 88 90 81 L





Oximetry   














  19





  05:43 05:44 05:49


 


Temperature   


 


Pulse Rate 188 H  165 H


 


Respiratory   





Rate   


 


Blood Pressure   


 


Blood Pressure   





[Left]   


 


O2 Sat by Pulse 83 L 88 78 L





Oximetry   














  19





  05:50 05:55 05:57


 


Temperature   98.0 F


 


Pulse Rate 72  90


 


Respiratory   18





Rate   


 


Blood Pressure   


 


Blood Pressure   109/63





[Left]   


 


O2 Sat by Pulse 81 L 79 L 94





Oximetry   














  19





  05:58 06:03 06:08


 


Temperature   


 


Pulse Rate 95 H 94 H 88


 


Respiratory   





Rate   


 


Blood Pressure 109/63  


 


Blood Pressure   





[Left]   


 


O2 Sat by Pulse 94 95 95





Oximetry   














  19





  06:13 06:18 06:23


 


Temperature   


 


Pulse Rate 88 85 90


 


Respiratory   





Rate   


 


Blood Pressure   


 


Blood Pressure   





[Left]   


 


O2 Sat by Pulse 94 93 94





Oximetry   














  19





  06:28 06:29 06:33


 


Temperature   


 


Pulse Rate 94 H 94 H 91 H


 


Respiratory   





Rate   


 


Blood Pressure   


 


Blood Pressure   





[Left]   


 


O2 Sat by Pulse 93 91 94





Oximetry   














  19





  06:38 06:43 06:48


 


Temperature   


 


Pulse Rate 100 H 99 H 105 H


 


Respiratory   





Rate   


 


Blood Pressure   


 


Blood Pressure   





[Left]   


 


O2 Sat by Pulse 96 95 96





Oximetry   














  19





  06:53 06:58 07:03


 


Temperature   


 


Pulse Rate 92 H 98 H 96 H


 


Respiratory   





Rate   


 


Blood Pressure   


 


Blood Pressure   





[Left]   


 


O2 Sat by Pulse 94 96 95





Oximetry   














  19





  07:08 07:13 07:18


 


Temperature   


 


Pulse Rate 97 H 98 H 98 H


 


Respiratory   





Rate   


 


Blood Pressure   


 


Blood Pressure   





[Left]   


 


O2 Sat by Pulse 96 95 96





Oximetry   














  19





  07:23 07:24 07:33


 


Temperature   


 


Pulse Rate 95 H 78 


 


Respiratory   





Rate   


 


Blood Pressure   


 


Blood Pressure   





[Left]   


 


O2 Sat by Pulse 96 80 L 84





Oximetry   














  19





  07:34 07:40 07:45


 


Temperature   


 


Pulse Rate 195 H 80 104 H


 


Respiratory   





Rate   


 


Blood Pressure   


 


Blood Pressure   





[Left]   


 


O2 Sat by Pulse 86 88 98





Oximetry   














  19





  07:50 07:55 08:00


 


Temperature   


 


Pulse Rate 111 H 115 H 105 H


 


Respiratory   





Rate   


 


Blood Pressure   


 


Blood Pressure   





[Left]   


 


O2 Sat by Pulse 100 99 98





Oximetry   














  19





  08:05 08:06 08:10


 


Temperature   


 


Pulse Rate 110 H 106 H 100 H


 


Respiratory   





Rate   


 


Blood Pressure  102/59 


 


Blood Pressure   





[Left]   


 


O2 Sat by Pulse 97  97





Oximetry   














  19





  08:14 08:15 08:20


 


Temperature 98.1 F  


 


Pulse Rate  106 H 106 H


 


Respiratory   





Rate   


 


Blood Pressure   


 


Blood Pressure   





[Left]   


 


O2 Sat by Pulse  97 97





Oximetry   














  19





  08:25 08:30 08:35


 


Temperature   


 


Pulse Rate 107 H 108 H 113 H


 


Respiratory   





Rate   


 


Blood Pressure   


 


Blood Pressure   





[Left]   


 


O2 Sat by Pulse 97 97 97





Oximetry   














  19





  08:40 08:45 08:50


 


Temperature   


 


Pulse Rate 116 H 106 H 109 H


 


Respiratory   





Rate   


 


Blood Pressure   


 


Blood Pressure   





[Left]   


 


O2 Sat by Pulse 97 96 97





Oximetry   














  19





  08:55 09:00 09:05


 


Temperature   


 


Pulse Rate 112 H 110 H 112 H


 


Respiratory   





Rate   


 


Blood Pressure   


 


Blood Pressure   





[Left]   


 


O2 Sat by Pulse 96 97 97





Oximetry   














  19





  09:10 09:15 09:20


 


Temperature   


 


Pulse Rate 115 H 102 H 105 H


 


Respiratory   





Rate   


 


Blood Pressure   


 


Blood Pressure   





[Left]   


 


O2 Sat by Pulse 97 96 95





Oximetry   














  19





  09:25 09:30 09:35


 


Temperature   


 


Pulse Rate 101 H 100 H 100 H


 


Respiratory   





Rate   


 


Blood Pressure   


 


Blood Pressure   





[Left]   


 


O2 Sat by Pulse 94 95 95





Oximetry   














  19





  09:40 09:45 09:51


 


Temperature   


 


Pulse Rate 100 H 117 H 


 


Respiratory   





Rate   


 


Blood Pressure   


 


Blood Pressure   





[Left]   


 


O2 Sat by Pulse 95 100 90





Oximetry   














  19





  09:56 10:01 10:06


 


Temperature   


 


Pulse Rate 103 H 101 H 101 H


 


Respiratory   





Rate   


 


Blood Pressure   


 


Blood Pressure   





[Left]   


 


O2 Sat by Pulse 94 95 96





Oximetry   














  19





  10:11 10:16 10:21


 


Temperature   


 


Pulse Rate 102 H 102 H 106 H


 


Respiratory   





Rate   


 


Blood Pressure   


 


Blood Pressure   





[Left]   


 


O2 Sat by Pulse 95 96 96





Oximetry   














  1919





  10:26 10:31 10:36


 


Temperature   


 


Pulse Rate 101 H 106 H 102 H


 


Respiratory   





Rate   


 


Blood Pressure   


 


Blood Pressure   





[Left]   


 


O2 Sat by Pulse 96 94 94





Oximetry   














  19





  10:37 10:41 10:43


 


Temperature   


 


Pulse Rate 108 H 107 H 106 H


 


Respiratory   





Rate   


 


Blood Pressure   


 


Blood Pressure   





[Left]   


 


O2 Sat by Pulse 92 96 90





Oximetry   














  19





  10:46 10:51 10:56


 


Temperature   


 


Pulse Rate 104 H 107 H 108 H


 


Respiratory   





Rate   


 


Blood Pressure   


 


Blood Pressure   





[Left]   


 


O2 Sat by Pulse 96 94 96





Oximetry   














  19





  11:01 11:06


 


Temperature  


 


Pulse Rate 101 H 105 H


 


Respiratory  





Rate  


 


Blood Pressure  


 


Blood Pressure  





[Left]  


 


O2 Sat by Pulse 95 99





Oximetry  














- Exam


Abdomen: Present: normal appearance, soft


Uterus: Present: normal


FHR: category 1


Uterine Contraction Monitor Mode: External


Uterine Contraction Pattern: Absent





- Labs


Labs: 


                                  Abnormal Labs











  19





  16:46 16:46 00:51


 


Hgb  10.0 L  


 


MCH  26 L  


 


RDW  16.1 H  


 


Mono % (Auto)  8.5 H  


 


Sodium   135 L 


 


Potassium   3.4 L 


 


Carbon Dioxide   19 L 


 


Creatinine   0.5 L 


 


Magnesium    5.50 H


 


Albumin   3.8 L 














  19





  06:06 11:55 19:53


 


Hgb   


 


MCH   


 


RDW   


 


Mono % (Auto)   


 


Sodium   


 


Potassium   


 


Carbon Dioxide   


 


Creatinine   


 


Magnesium  6.00 H  6.50 H  5.90 H


 


Albumin   














  19





  00:57 05:41


 


Hgb  


 


MCH  


 


RDW  


 


Mono % (Auto)  


 


Sodium  


 


Potassium  


 


Carbon Dioxide  


 


Creatinine  


 


Magnesium  6.00 H  6.30 H


 


Albumin

## 2019-12-09 NOTE — CONSULTATION
History of Present Illness


Consult date: 19


Requesting physician: NIKOLAI SMITH


History of present illness: 








Ms. Hinojosa is a 23yo, , at 27 2/7 weeks with ROSEANNE of 3/7/20. 





She is followed outpatient by [facility] secondary to Andrew twin pregnancy, PTL/D hx, 

and Cervical shortening with current pregnancy. 





She was recently seen in [facility] office on 19 for twin and cervical length surv

eillance.  





Cervical length was 1.17cm by transvaginal ultrasound assessment and reassuring 

fetal surveillance appreciated during assessment. 





She presents to L&D from primary OB office on 19 after evaluation of cervix

to be 3-4 cm dilated around 1 PM then rechecked later and told she was 5 cm 

dilated








------------------------------------------------





Denies reg contractions vag bleed leaking 





Pos FM's





's A and B - Will order BPP on Monday's and  and EF 














Past History


Past Medical History: no pertinent history


Past Surgical History:  section


GYN History: trichomonas





- Obstetrical History


: 4


Hx # Term Pregnancies: 1


Number of  Pregnancies: 1


Number of Living Children: 2














Past History


Past Medical History: no pertinent history


Past Surgical History:  section


GYN History: trichomonas





- Obstetrical History


: 4














Past History


Past Medical History: no pertinent history


Past Surgical History:  section


GYN History: trichomonas





- Obstetrical History


: 4





Medications and Allergies


                                    Allergies











Allergy/AdvReac Type Severity Reaction Status Date / Time


 


No Known Allergies Allergy   Verified 19 21:51











                                Home Medications











 Medication  Instructions  Recorded  Confirmed  Last Taken  Type


 


Prenatal Vit-Fe Fumar-FA [Prenatal 1 tab PO Q24H 19 

History





Vitamin]     


 


Progesterone, Micronized 200 mg PO QPM 19 18:00 History





[Progesterone]     











Active Meds: 


Active Medications





Acetaminophen (Tylenol)  650 mg PO Q4H PRN


   PRN Reason: Pain MILD(1-3)/Fever >100.5/HA


   Last Admin: 19 13:48 Dose:  650 mg


   Documented by: 


Diphenhydramine HCl (Benadryl)  25 mg PO Q6H PRN


   PRN Reason: Itching


Docusate Sodium (Colace)  100 mg PO Q12H PRN


   PRN Reason: Constipation


Lactated Ringer's (Lactated Ringers)  1,000 mls @ 125 mls/hr IV AS DIRECT JULIA


   Last Admin: 19 01:43 Dose:  125 mls/hr


   Documented by: 


Ampicillin Sodium (Ampicillin/Ns 1 Gm/50 Ml)  1 gm in 50 mls @ 100 mls/hr IV Q6H

JULIA; Protocol


   Last Admin: 19 03:05 Dose:  100 mls/hr


   Documented by: 


Indomethacin (Indocin)  25 mg PO Q6HR JULIA


   Last Admin: 19 08:22 Dose:  25 mg


   Documented by: 


Multivitamins/Iron/Calcium (Prenatal Vitamin)  1 each PO QDAY [facility]


   Last Admin: 19 12:47 Dose:  1 each


   Documented by: 


Ondansetron HCl (Zofran)  4 mg IV Q6H PRN


   PRN Reason: Nausea And Vomiting


Senna/Docusate Sodium (Senokot S)  2 tab PO Q12H PRN


   PRN Reason: Laxative Effect


Simethicone (Mylicon)  80 mg PO Q6H PRN


   PRN Reason: Gas pain











- Vital Signs


Vital signs: 


                                   Vital Signs











Pulse BP


 


 103 H  119/67 


 


 19 14:33  19 14:33








                                        











Temp Pulse Resp BP Pulse Ox


 


 98.1 F   35 L  18   102/59   79 L


 


 19 08:14  19 13:46  19 05:57  19 08:06  19 13:46














Results


Result Diagrams: 


                                 19 16:46





                                 19 16:46


All other labs normal.








Assessment and Plan











Assessment and Plan








A-


Andrew IUP 27 2/7 weeks (ROSEANNE 3/7/20)


VSS


------------


BPP and ultrasound performed inpatient-


Twin A BPP 8/8 Breech


Twin B BPP 8/8 Transverse


------------


Cervical shortening- 1.17cm ( by [facility] assessment on 19)


PTL/D hx


Magnesium Sulfate previously given


Ampicillin ordered


Steroid Complete


Positive Trichomonas in October, MAVERICK performed at primary OB today     


     











P-


Continue with plan of care


Magnesium Sulfate for Neuro-protection given


Steroid complete


Continuous fetal monitoring, toco for contractions


May try Indocin 50 mg load then 25 mg q 6 hours x 3 days after completed may 

administer Procardia 10mg TID


NICU consult


Consider delivery with fetal distress/nonreassuring fetal behavior.


If continues to dilate would proceed to Repeat C/S if stable 


If no contractions and remains 5 cm could continue conservative management 


Please obtain BPP Twice per week (Monday and ) - Please also obtain US 

EFW q 2-3 weeks while in house


Seq Leg compressors

## 2019-12-10 RX ADMIN — INDOMETHACIN SCH MG: 25 CAPSULE ORAL at 12:13

## 2019-12-10 RX ADMIN — ACETAMINOPHEN PRN MG: 325 TABLET ORAL at 15:26

## 2019-12-10 RX ADMIN — Medication SCH EACH: at 12:13

## 2019-12-10 RX ADMIN — INDOMETHACIN SCH MG: 25 CAPSULE ORAL at 05:57

## 2019-12-10 RX ADMIN — SODIUM CHLORIDE, SODIUM LACTATE, POTASSIUM CHLORIDE, AND CALCIUM CHLORIDE SCH MLS/HR: .6; .31; .03; .02 INJECTION, SOLUTION INTRAVENOUS at 06:11

## 2019-12-10 RX ADMIN — INDOMETHACIN SCH MG: 25 CAPSULE ORAL at 00:03

## 2019-12-10 RX ADMIN — INDOMETHACIN SCH MG: 25 CAPSULE ORAL at 17:56

## 2019-12-10 RX ADMIN — SODIUM CHLORIDE, SODIUM LACTATE, POTASSIUM CHLORIDE, AND CALCIUM CHLORIDE SCH MLS/HR: .6; .31; .03; .02 INJECTION, SOLUTION INTRAVENOUS at 12:15

## 2019-12-10 NOTE — ULTRASOUND REPORT
ULTRASOUND OB LIMITED



HISTORY: Fetal well being, evaluate fetal heart tones



TECHNIQUE: Transabdominal ultrasound.



FINDINGS:



A twin gestation is identified. 



Twin A is in transverse position with head to the maternal left. Heart rate measures 150 bpm. 



Twin B is in transverse position with head to the maternal right. Heart rate measures 159 bpm.



IMPRESSION:

Twin gestation as described above.



Signer Name: Alejandro العراقي Jr, MD 

Signed: 12/10/2019 10:58 AM

 Workstation Name: [id number]

## 2019-12-10 NOTE — ULTRASOUND REPORT
ULTRASOUND FETAL BIOPHYSICAL PROFILE



INDICATION:  IUP @ 27 weeks; Twins.



Technique: Transabdominal imaging.



FINDINGS:



Twin A:



Fetal heart rate is 150 beats per minute.



Fetal breathing movement = 2

Gross body movement = 2

Fetal tone = 2

Qualitative amniotic fluid volume = 2





Twin B



Fetal heart rate is 159 beats per minute.



Fetal breathing movement = 2

Gross body movement = 2

Fetal tone = 2

Qualitative amniotic fluid volume = 2





IMPRESSION:

Twin gestation. Biophysical profile score is 8/8 in both fetuses.



Signer Name: Alejandro العراقي Jr, MD 

Signed: 12/10/2019 11:00 AM

 Workstation Name: [id number]

## 2019-12-10 NOTE — PROGRESS NOTE
Assessment and Plan





- Patient Problems


(1) 27 weeks gestation of pregnancy


Onset Date: 19   Current Visit: Yes   Status: Acute   


Plan to address problem: 


A:  IUP @ 27 3/7 weeks


      Twin gestation (Di/Di) - Breech/Transverse


      Previous C Section


      Shortened cervix


      Advanced cervical dilatation





 P:  Continue with present management as per [facility] recommendations


      Will begin Procardia 10mg Q8H  after discontinue Indocin after 3 days


      S/P Magnesium sulfate


      S/P IM steroids and IV Ampicillin


      Appreciate NICU and [facility] consultation


      Will continue with conservative management and deliver by Repeat C Section

if labor resumes











(2) 26 weeks gestation of pregnancy


Onset Date: 19   Current Visit: Yes   Status: Resolved   





(3) Twin gestation, dichorionic diamniotic


Onset Date: 19   Current Visit: Yes   Status: Acute   


Qualifiers: 


   Trimester: third trimester   Qualified Code(s): O30.043 - Twin pregnancy, 

dichorionic/diamniotic, third trimester   





(4) Previous  section complicating pregnancy


Onset Date: 19   Current Visit: Yes   Status: Acute   





(5) Cervical shortening affecting pregnancy in third trimester


Onset Date: 19   Current Visit: Yes   Status: Acute   





Subjective





- Subjective


Date of service: 12/10/19


Principal diagnosis: IUP @ 27 3/7 weeks;  Twin gestation; PTL


Interval history: 





Pt is a 25yo BF  EDC 3/7/20;  EGA 27 3/7 weeks presented to L&D from the 

office for evaluation of shortened cervix.  She received prenatal care at 

[facility] since 13 weeks and co-managed by [facility] for Twin gestation

(Di/Di), shortened cervix and Alpha Thalassemia carrier. She denied 

contractions, but cervix was 3/50/-3 in the office per CNM. Her cervical  

dilatation was arrested at 5/60/-3 on IV Magnesium sulfate, IV Ampicillin and 

she completed her 2nd dose of steroids, and currently not keara on Indocin

25mg Q6H. +FM.  No bleeding or ROM.  BPP 8/8 x 2 today.


Patient reports: fetal movement normal, no new complaints, no loss of fluid, no 

vaginal bleeding, no contractions





Objective





- Vital Signs


Vital Signs: 


                               Vital Signs - 12hr











  12/10/19 12/10/19 12/10/19





  04:09 05:57 08:57


 


Temperature 98.6 F  


 


Pulse Rate 98 H 94 H 108 H


 


Respiratory 16  





Rate   


 


Blood Pressure  101/56 101/54


 


Blood Pressure   





[Left]   


 


Blood Pressure 100/60  





[Right]   


 


O2 Sat by Pulse 96  





Oximetry   














  12/10/19 12/10/19 12/10/19





  08:58 10:49 10:54


 


Temperature 98.2 F  


 


Pulse Rate 108 H 108 H 109 H


 


Respiratory 12  





Rate   


 


Blood Pressure   


 


Blood Pressure 101/54  





[Left]   


 


Blood Pressure   





[Right]   


 


O2 Sat by Pulse  97 97





Oximetry   














  12/10/19 12/10/19 12/10/19





  10:59 11:04 11:09


 


Temperature   


 


Pulse Rate 106 H 105 H 103 H


 


Respiratory   





Rate   


 


Blood Pressure   


 


Blood Pressure   





[Left]   


 


Blood Pressure   





[Right]   


 


O2 Sat by Pulse 98 97 97





Oximetry   














  12/10/19 12/10/19 12/10/19





  11:14 11:19 11:24


 


Temperature   


 


Pulse Rate 111 H 116 H 103 H


 


Respiratory   





Rate   


 


Blood Pressure   


 


Blood Pressure   





[Left]   


 


Blood Pressure   





[Right]   


 


O2 Sat by Pulse 98 98 97





Oximetry   














  12/10/19 12/10/19 12/10/19





  11:29 11:34 11:39


 


Temperature   


 


Pulse Rate 110 H 112 H 105 H


 


Respiratory   





Rate   


 


Blood Pressure   


 


Blood Pressure   





[Left]   


 


Blood Pressure   





[Right]   


 


O2 Sat by Pulse 97 97 97





Oximetry   














  12/10/19 12/10/19 12/10/19





  11:44 11:49 11:54


 


Temperature   


 


Pulse Rate 105 H 111 H 107 H


 


Respiratory   





Rate   


 


Blood Pressure   


 


Blood Pressure   





[Left]   


 


Blood Pressure   





[Right]   


 


O2 Sat by Pulse 97 97 98





Oximetry   














  12/10/19 12/10/19 12/10/19





  12:02 13:06 13:15


 


Temperature   


 


Pulse Rate 65 77 175 H


 


Respiratory   





Rate   


 


Blood Pressure   


 


Blood Pressure   





[Left]   


 


Blood Pressure   





[Right]   


 


O2 Sat by Pulse 73 L 85 93





Oximetry   














  12/10/19 12/10/19 12/10/19





  13:20 13:25 13:30


 


Temperature   


 


Pulse Rate 110 H 103 H 104 H


 


Respiratory   





Rate   


 


Blood Pressure   


 


Blood Pressure   





[Left]   


 


Blood Pressure   





[Right]   


 


O2 Sat by Pulse 98 97 97





Oximetry   














  12/10/19 12/10/19 12/10/19





  13:35 13:40 13:45


 


Temperature   


 


Pulse Rate 100 H 100 H 100 H


 


Respiratory   





Rate   


 


Blood Pressure   


 


Blood Pressure   





[Left]   


 


Blood Pressure   





[Right]   


 


O2 Sat by Pulse 98 97 98





Oximetry   














  12/10/19 12/10/19 12/10/19





  13:50 13:55 14:00


 


Temperature   


 


Pulse Rate 102 H 114 H 107 H


 


Respiratory   





Rate   


 


Blood Pressure   


 


Blood Pressure   





[Left]   


 


Blood Pressure   





[Right]   


 


O2 Sat by Pulse 99 99 98





Oximetry   














  12/10/19 12/10/19 12/10/19





  14:05 14:10 14:15


 


Temperature   


 


Pulse Rate 110 H 102 H 102 H


 


Respiratory   





Rate   


 


Blood Pressure   


 


Blood Pressure   





[Left]   


 


Blood Pressure   





[Right]   


 


O2 Sat by Pulse 98 99 100





Oximetry   














  12/10/19 12/10/19 12/10/19





  14:20 14:25 14:30


 


Temperature   


 


Pulse Rate 103 H 100 H 105 H


 


Respiratory   





Rate   


 


Blood Pressure   


 


Blood Pressure   





[Left]   


 


Blood Pressure   





[Right]   


 


O2 Sat by Pulse 98 98 98





Oximetry   














  12/10/19 12/10/19 12/10/19





  14:35 14:40 14:45


 


Temperature   


 


Pulse Rate 98 H 101 H 99 H


 


Respiratory   





Rate   


 


Blood Pressure   


 


Blood Pressure   





[Left]   


 


Blood Pressure   





[Right]   


 


O2 Sat by Pulse 98 99 98





Oximetry   














  12/10/19 12/10/19 12/10/19





  14:50 14:55 15:00


 


Temperature   


 


Pulse Rate 100 H 99 H 95 H


 


Respiratory   





Rate   


 


Blood Pressure   


 


Blood Pressure   





[Left]   


 


Blood Pressure   





[Right]   


 


O2 Sat by Pulse 98 98 98





Oximetry   














  12/10/19 12/10/19 12/10/19





  15:03 15:05 15:10


 


Temperature 98.8 F  


 


Pulse Rate 104 H 98 H 116 H


 


Respiratory 14  





Rate   


 


Blood Pressure 108/65  


 


Blood Pressure 108/65  





[Left]   


 


Blood Pressure   





[Right]   


 


O2 Sat by Pulse  98 98





Oximetry   














- Exam


Breasts: deferred


Abdomen: Present: normal appearance, soft


Uterus: Present: normal


FHR: category 1


Uterine Contraction Monitor Mode: External


Uterine Contraction Pattern: Absent





- Labs


Labs: 


                                  Abnormal Labs











  19





  16:46 16:46 00:51


 


Hgb  10.0 L  


 


MCH  26 L  


 


RDW  16.1 H  


 


Mono % (Auto)  8.5 H  


 


Sodium   135 L 


 


Potassium   3.4 L 


 


Carbon Dioxide   19 L 


 


Creatinine   0.5 L 


 


Magnesium    5.50 H


 


Albumin   3.8 L 














  19





  06:06 11:55 19:53


 


Hgb   


 


MCH   


 


RDW   


 


Mono % (Auto)   


 


Sodium   


 


Potassium   


 


Carbon Dioxide   


 


Creatinine   


 


Magnesium  6.00 H  6.50 H  5.90 H


 


Albumin   














  19





  00:57 05:41


 


Hgb  


 


MCH  


 


RDW  


 


Mono % (Auto)  


 


Sodium  


 


Potassium  


 


Carbon Dioxide  


 


Creatinine  


 


Magnesium  6.00 H  6.30 H


 


Albumin  














- Results


US- obstetric: report reviewed (BPP 8/8 x 2 12/10/19)

## 2019-12-10 NOTE — ULTRASOUND REPORT
ULTRASOUND FETAL BIOPHYSICAL PROFILE



INDICATION:  IUP @ 27 weeks; Twins.



Technique: Transabdominal imaging.



FINDINGS:



Twin A:



Fetal heart rate is 150 beats per minute.



Fetal breathing movement = 2

Gross body movement = 2

Fetal tone = 2

Qualitative amniotic fluid volume = 2





Twin B



Fetal heart rate is 159 beats per minute.



Fetal breathing movement = 2

Gross body movement = 2

Fetal tone = 2

Qualitative amniotic fluid volume = 2





IMPRESSION:

Twin gestation. Biophysical profile score is 8/8 in both fetuses.



Signer Name: Alejandro العراقي Jr, MD 

Signed: 12/10/2019 11:00 AM

 Workstation Name: [id number]

## 2019-12-11 RX ADMIN — INDOMETHACIN SCH MG: 25 CAPSULE ORAL at 07:50

## 2019-12-11 RX ADMIN — Medication SCH EACH: at 10:04

## 2019-12-11 RX ADMIN — NIFEDIPINE SCH MG: 10 CAPSULE ORAL at 18:56

## 2019-12-11 RX ADMIN — INDOMETHACIN SCH MG: 25 CAPSULE ORAL at 02:21

## 2019-12-11 RX ADMIN — SODIUM CHLORIDE, SODIUM LACTATE, POTASSIUM CHLORIDE, AND CALCIUM CHLORIDE SCH MLS/HR: .6; .31; .03; .02 INJECTION, SOLUTION INTRAVENOUS at 05:42

## 2019-12-11 NOTE — PROGRESS NOTE
Assessment and Plan





- Patient Problems


(1) 27 weeks gestation of pregnancy


Onset Date: 19   Current Visit: Yes   Status: Acute   


Plan to address problem: 


A:  IUP @ 27 4/7 weeks


      Twin gestation (Di/Di) - Breech/Transverse


      Previous C Section


      Shortened cervix


      Advanced cervical dilatation





 P:  Continue with present management as per [facility] recommendations


      Continue Procardia 10mg Q8H  


      S/P Indocin for 3 days


      S/P Magnesium sulfate


      S/P IM steroids and IV Ampicillin


      Appreciate NICU and [facility] consultation


      Will continue with conservative management and deliver by Repeat C Section

if labor resumes











(2) 26 weeks gestation of pregnancy


Onset Date: 19   Current Visit: Yes   Status: Resolved   





(3) Twin gestation, dichorionic diamniotic


Onset Date: 19   Current Visit: Yes   Status: Acute   


Qualifiers: 


   Trimester: third trimester   Qualified Code(s): O30.043 - Twin pregnancy, 

dichorionic/diamniotic, third trimester   





(4) Previous  section complicating pregnancy


Onset Date: 19   Current Visit: Yes   Status: Acute   





(5) Cervical shortening affecting pregnancy in third trimester


Onset Date: 19   Current Visit: Yes   Status: Acute   





Subjective





- Subjective


Date of service: 19


Principal diagnosis: IUP @ 27 4/7 weeks;  Twin gestation; PTL


Interval history: 





Pt is a 23yo BF  EDC 3/7/20;  EGA 27 4/7 weeks presented to L&D from the 

office for evaluation of shortened cervix.  She received prenatal care at 

[facility] since 13 weeks and co-managed by [facility] for Twin gestation

(Di/Di), shortened cervix and Alpha Thalassemia carrier. She denied 

contractions, but cervix was 3/50/-3 in the office per CNM. Her cervical  

dilatation was arrested at 5/60/-3 on IV Magnesium sulfate, IV Ampicillin and 

she completed her 2nd dose of steroids, she completed her regimen of 25mg Q6H x 

3 days, and currently on Procardia 10mg Q8H.   +FM.  No bleeding or ROM.  BPP 

8/8 x 2 yesterday.


Patient reports: new complaints (yeast infection), fetal movement normal, no 

loss of fluid, no vaginal bleeding, no contractions





Objective





- Vital Signs


Vital Signs: 


                               Vital Signs - 12hr











  19





  00:14 00:15 00:23


 


Temperature   


 


Pulse Rate 40 L  144 H


 


Respiratory   





Rate   


 


Blood Pressure   


 


Blood Pressure   





[Right]   


 


O2 Sat by Pulse 92 88 81 L





Oximetry   














  19





  00:28 04:03 04:06


 


Temperature  98.1 F 


 


Pulse Rate 99 H  95 H


 


Respiratory  18 





Rate   


 


Blood Pressure   95/53


 


Blood Pressure   





[Right]   


 


O2 Sat by Pulse 73 L  





Oximetry   














  19





  07:49


 


Temperature 98.3 F


 


Pulse Rate 98 H


 


Respiratory 16





Rate 


 


Blood Pressure 91/52


 


Blood Pressure 91/52





[Right] 


 


O2 Sat by Pulse 





Oximetry 














- Exam


Breasts: deferred


Abdomen: Present: normal appearance, soft


Uterus: Present: normal


FHR: category 1


Uterine Contraction Monitor Mode: External


Cervical Dilatation: 4


Cervical Effacement Percentage: 50


Fetal station: -3


Uterine Contraction Pattern: Absent





- Labs


Labs: 


                                  Abnormal Labs











  19





  16:46 16:46 00:51


 


Hgb  10.0 L  


 


MCH  26 L  


 


RDW  16.1 H  


 


Mono % (Auto)  8.5 H  


 


Sodium   135 L 


 


Potassium   3.4 L 


 


Carbon Dioxide   19 L 


 


Creatinine   0.5 L 


 


Magnesium    5.50 H


 


Albumin   3.8 L 














  19





  06:06 11:55 19:53


 


Hgb   


 


MCH   


 


RDW   


 


Mono % (Auto)   


 


Sodium   


 


Potassium   


 


Carbon Dioxide   


 


Creatinine   


 


Magnesium  6.00 H  6.50 H  5.90 H


 


Albumin   














  19





  00:57 05:41


 


Hgb  


 


MCH  


 


RDW  


 


Mono % (Auto)  


 


Sodium  


 


Potassium  


 


Carbon Dioxide  


 


Creatinine  


 


Magnesium  6.00 H  6.30 H


 


Albumin  














- Results


US- obstetric: report reviewed (BPP 8/8 x 2 12/10/19)

## 2019-12-11 NOTE — ULTRASOUND REPORT
OB Ultrasound



HISTORY: IUP @ 27 weeks; Fetal growth.



TECHNIQUE:  Grayscale and color Doppler  imaging performed.



COMPARISON:  Limited OB ultrasound from today and 12/10



FINDINGS: There are viable twin gestations.  



Twin A: Transverse presentation with heart rate of 153 bpm. Overall EGA is 26 weeks and 3 days by ult
rasound with clinical gestational age of 27 weeks and 4 days which is within approximately 1 week con
gruence. Placenta is positioned posteriorly. There is an amniotic fluid pocket measuring 2.5 cm.



Twin B: Cephalic presentation with heart rate of 146 bpm. Overall EGA is 27 weeks and 4 days which ma
tches the clinical gestational age. There is a amniotic fluid pocket measuring 4.6 cm. Placenta is se
en along the fundal margin.



There is cervical funneling when fundal pressure is applied, measuring up to 1 cm in maximal dimensio
n. Funneling length is 5.7 cm



IMPRESSION: 

1. Viable twin gestations as above.

2. Cervical funneling as above with fundal pressure is applied.



Signer Name: Tushar Chan MD 

Signed: 12/11/2019 5:38 PM

 Workstation Name: [id number]

## 2019-12-11 NOTE — ULTRASOUND REPORT
US OB limited



INDICATION:

Evaluate fetal heart tones.



COMPARISON:

OB ultrasound from 12/10/2019.



FINDINGS:

A live twin pregnancy is again seen. Each fetus is in breech presentation. Twin A has a heart rate of
 1 53 bpm. Twin B has a heart rate of 158 bpm. No other significant sonographic abnormality is seen.



IMPRESSION:

Live twin pregnancy as above.



Signer Name: Rah Ramon MD 

Signed: 12/11/2019 4:19 AM

 Workstation Name: [email or web address]

## 2019-12-12 RX ADMIN — NIFEDIPINE SCH MG: 10 CAPSULE ORAL at 22:07

## 2019-12-12 RX ADMIN — Medication SCH EACH: at 11:04

## 2019-12-12 RX ADMIN — NIFEDIPINE SCH MG: 10 CAPSULE ORAL at 13:55

## 2019-12-12 NOTE — PROGRESS NOTE
Assessment and Plan








A-


Andrew IUP 27.5 weeks (ROSEANNE 3/7/20)


VSS


BPP- 8/8 for fetus A and B


Ultrasound- Fetus A 934g,MVP 2.5cm, Fetus B 1077g, MVP 4.6cm


Cervical shortening


PTL/D hx- arrest at 5cm


Complained of contractions overnight and this morning- resolved since initiation

of Procardia


Denies current PTL symptoms, leaking of fluid, and bleeding


Procardia 10mg TID


S/P Magnesium Sulfate, Indocin x 3 days, and Betamethasone x 2


   


     











P-


Continue with plan of care of conservative management.


EFW Q2-3 weeks, BPPs twice weekly ( Monday's and Thursday's)


Continuous fetal monitoring, toco for contractions


Continue tocolysis as indicated with Procardia


Consider delivery with fetal distress/nonreassuring fetal behavior.


If continues to dilate would proceed to Repeat C/S if stable 

















With additional questions or concerns, please contact MIRELLA BYNUM on call. Thank you.








Subjective





- Subjective


Date of service: 12/12/19


Principal diagnosis: IUP @ 27 5/7 weeks;  Twin gestation; PTL


Patient reports: new complaints (yeast infection), fetal movement normal, no 

loss of fluid, no vaginal bleeding, no contractions





Objective





- Vital Signs


Vital Signs: 


                               Vital Signs - 12hr











  12/12/19 12/12/19 12/12/19





  07:38 07:40 11:47


 


Temperature  98.0 F 


 


Pulse Rate 109 H 100 H 113 H


 


Respiratory  16 





Rate   


 


Blood Pressure 91/52  88/50


 


Blood Pressure  91/52 





[Right]   


 


O2 Sat by Pulse 98 92 





Oximetry   














  12/12/19





  11:48


 


Temperature 98.4 F


 


Pulse Rate 113 H


 


Respiratory 16





Rate 


 


Blood Pressure 


 


Blood Pressure 88/50





[Right] 


 


O2 Sat by Pulse 





Oximetry 














- Exam


Breasts: deferred


Cardiovascular: Regular rate, Normal S1, Normal S2


Lungs: Clear to auscultation, Normal air movement


Abdomen: Present: normal appearance, soft, other (gravid)





- Labs


Labs: 


                                  Abnormal Labs











  12/05/19 12/05/19 12/06/19





  16:46 16:46 00:51


 


Hgb  10.0 L  


 


MCH  26 L  


 


RDW  16.1 H  


 


Mono % (Auto)  8.5 H  


 


Sodium   135 L 


 


Potassium   3.4 L 


 


Carbon Dioxide   19 L 


 


Creatinine   0.5 L 


 


Magnesium    5.50 H


 


Albumin   3.8 L 














  12/06/19 12/06/19 12/06/19





  06:06 11:55 19:53


 


Hgb   


 


MCH   


 


RDW   


 


Mono % (Auto)   


 


Sodium   


 


Potassium   


 


Carbon Dioxide   


 


Creatinine   


 


Magnesium  6.00 H  6.50 H  5.90 H


 


Albumin   














  12/07/19 12/07/19





  00:57 05:41


 


Hgb  


 


MCH  


 


RDW  


 


Mono % (Auto)  


 


Sodium  


 


Potassium  


 


Carbon Dioxide  


 


Creatinine  


 


Magnesium  6.00 H  6.30 H


 


Albumin

## 2019-12-12 NOTE — PROGRESS NOTE
Assessment and Plan





- Patient Problems


(1) 27 weeks gestation of pregnancy


Onset Date: 19   Current Visit: Yes   Status: Acute   


Plan to address problem: 


A:  IUP @ 27 5/7 weeks


      Twin gestation (Di/Di) - Transverse/Cephalic


      Previous C Section


      Shortened cervix - stable


      Advanced cervical dilatation - stable





 P:  Continue with present management as per [facility] recommendations


      Continue Procardia 10mg Q8H  


      S/P Indocin for 3 days


      S/P Magnesium sulfate


      S/P IM steroids and IV Ampicillin


      Appreciate NICU and [facility] consultation


      Will continue with conservative management and deliver by Repeat C Section

if labor resumes











(2) 26 weeks gestation of pregnancy


Onset Date: 19   Current Visit: Yes   Status: Resolved   





(3) Twin gestation, dichorionic diamniotic


Onset Date: 19   Current Visit: Yes   Status: Acute   


Qualifiers: 


   Trimester: third trimester   Qualified Code(s): O30.043 - Twin pregnancy, 

dichorionic/diamniotic, third trimester   





(4) Previous  section complicating pregnancy


Onset Date: 19   Current Visit: Yes   Status: Acute   





(5) Cervical shortening affecting pregnancy in third trimester


Onset Date: 19   Current Visit: Yes   Status: Acute   





Subjective





- Subjective


Date of service: 19


Principal diagnosis: IUP @ 27 5/7 weeks;  Twin gestation; PTL


Interval history: 





Pt is a 25yo BF  EDC 3/7/20;  EGA 27 5/7 weeks presented to L&D from the 

office for evaluation of shortened cervix.  She received prenatal care at 

[facility] since 13 weeks and co-managed by [facility] for Twin gestation

(Di/Di), shortened cervix and Alpha Thalassemia carrier. She denied 

contractions, but cervix was 3/50/-3 in the office per CNM. Her cervical  

dilatation was arrested at 5/60/-3 on IV Magnesium sulfate, IV Ampicillin and 

she completed her 2nd dose of steroids, she completed her regimen of 25mg Q6H x 

3 days, and currently on Procardia 10mg Q8H.   +FM.  No bleeding or ROM.  BPP 

8/8 x 2 .


Patient reports: new complaints (yeast infection), fetal movement normal, no los

s of fluid, no vaginal bleeding, no contractions





Objective





- Vital Signs


Vital Signs: 


                               Vital Signs - 12hr











  19





  07:38 07:40


 


Temperature  98.0 F


 


Pulse Rate 109 H 100 H


 


Respiratory  16





Rate  


 


Blood Pressure 91/52 


 


Blood Pressure  91/52





[Right]  


 


O2 Sat by Pulse 98 92





Oximetry  














- Exam


Abdomen: Present: normal appearance, soft


Uterus: Present: normal


FHR: category 1


Uterine Contraction Monitor Mode: External


Uterine Contraction Pattern: Absent





- Labs


Labs: 


                                  Abnormal Labs











  19





  16:46 16:46 00:51


 


Hgb  10.0 L  


 


MCH  26 L  


 


RDW  16.1 H  


 


Mono % (Auto)  8.5 H  


 


Sodium   135 L 


 


Potassium   3.4 L 


 


Carbon Dioxide   19 L 


 


Creatinine   0.5 L 


 


Magnesium    5.50 H


 


Albumin   3.8 L 














  19





  06:06 11:55 19:53


 


Hgb   


 


MCH   


 


RDW   


 


Mono % (Auto)   


 


Sodium   


 


Potassium   


 


Carbon Dioxide   


 


Creatinine   


 


Magnesium  6.00 H  6.50 H  5.90 H


 


Albumin   














  19





  00:57 05:41


 


Hgb  


 


MCH  


 


RDW  


 


Mono % (Auto)  


 


Sodium  


 


Potassium  


 


Carbon Dioxide  


 


Creatinine  


 


Magnesium  6.00 H  6.30 H


 


Albumin  














- Results


US- obstetric: report reviewed (Twin A - Transverse, 934gms; Twin B - Cephalic 

1077gms)

## 2019-12-13 RX ADMIN — SODIUM CHLORIDE, SODIUM LACTATE, POTASSIUM CHLORIDE, AND CALCIUM CHLORIDE SCH MLS/HR: .6; .31; .03; .02 INJECTION, SOLUTION INTRAVENOUS at 22:36

## 2019-12-13 RX ADMIN — NIFEDIPINE SCH MG: 10 CAPSULE ORAL at 14:31

## 2019-12-13 RX ADMIN — SODIUM CHLORIDE, SODIUM LACTATE, POTASSIUM CHLORIDE, AND CALCIUM CHLORIDE SCH MLS/HR: .6; .31; .03; .02 INJECTION, SOLUTION INTRAVENOUS at 06:58

## 2019-12-13 RX ADMIN — SODIUM CHLORIDE, SODIUM LACTATE, POTASSIUM CHLORIDE, AND CALCIUM CHLORIDE SCH MLS/HR: .6; .31; .03; .02 INJECTION, SOLUTION INTRAVENOUS at 14:31

## 2019-12-13 RX ADMIN — NIFEDIPINE SCH MG: 10 CAPSULE ORAL at 22:35

## 2019-12-13 RX ADMIN — Medication SCH EACH: at 09:59

## 2019-12-13 RX ADMIN — NIFEDIPINE SCH MG: 10 CAPSULE ORAL at 06:57

## 2019-12-13 NOTE — PROGRESS NOTE
Assessment and Plan





- Patient Problems


(1) 27 weeks gestation of pregnancy


Onset Date: 19   Current Visit: Yes   Status: Acute   


Plan to address problem: 


A:  IUP @ 27 6/7 weeks


      Twin gestation (Di/Di) - Transverse/Cephalic


      Previous C Section


      Shortened cervix - stable


      Advanced cervical dilatation - stable





 P:  Continue with present management as per [facility] recommendations


      Continue Procardia 10mg Q8H  


      S/P Indocin for 3 days


      S/P Magnesium sulfate


      S/P IM steroids and IV Ampicillin


      Appreciate NICU and [facility] consultation


      Will continue with conservative management and deliver by Repeat C Section

if labor resumes











(2) 26 weeks gestation of pregnancy


Onset Date: 19   Current Visit: Yes   Status: Resolved   





(3) Twin gestation, dichorionic diamniotic


Onset Date: 19   Current Visit: Yes   Status: Acute   


Qualifiers: 


   Trimester: third trimester   Qualified Code(s): O30.043 - Twin pregnancy, 

dichorionic/diamniotic, third trimester   





(4) Previous  section complicating pregnancy


Onset Date: 19   Current Visit: Yes   Status: Acute   





(5) Cervical shortening affecting pregnancy in third trimester


Onset Date: 19   Current Visit: Yes   Status: Acute   





Subjective





- Subjective


Date of service: 19


Principal diagnosis: IUP @ 27 6/7 weeks;  Twin gestation; PTL


Interval history: 





Pt is a 23yo BF  EDC 3/7/20;  EGA 27 6/7 weeks presented to L&D from the 

office for evaluation of shortened cervix.  She received prenatal care at 

[facility] since 13 weeks and co-managed by [facility] for Twin gestation

(Di/Di), shortened cervix and Alpha Thalassemia carrier. She denied 

contractions, but cervix was 3/50/-3 in the office per CNM. Her cervical  

dilatation was arrested at 5/60/-3 on IV Magnesium sulfate, IV Ampicillin and 

she completed her 2nd dose of steroids, she completed her regimen of 25mg Q6H x 

3 days, and currently on Procardia 10mg Q8H.   +FM.  No bleeding or ROM.  BPP 

8/8 x 2. She had some contractions last night, now resolved.


Patient reports: new complaints (yeast infection), fetal movement normal, no 

loss of fluid, no vaginal bleeding, no contractions





Objective





- Vital Signs


Vital Signs: 


                               Vital Signs - 12hr











  1919





  06:56 06:58 06:59


 


Temperature   


 


Pulse Rate 96 H  98 H


 


Respiratory  18 





Rate   


 


Blood Pressure 101/59  99/55


 


Blood Pressure   





[Right]   














  19





  07:12


 


Temperature 98.1 F


 


Pulse Rate 101 H


 


Respiratory 16





Rate 


 


Blood Pressure 96/52


 


Blood Pressure 96/52





[Right] 














- Exam


Abdomen: Present: normal appearance, soft


Uterus: Present: normal


FHR: category 1


Uterine Contraction Monitor Mode: External


Uterine Contraction Pattern: Absent





- Labs


Labs: 


                                  Abnormal Labs











  19





  16:46 16:46 00:51


 


Hgb  10.0 L  


 


MCH  26 L  


 


RDW  16.1 H  


 


Mono % (Auto)  8.5 H  


 


Sodium   135 L 


 


Potassium   3.4 L 


 


Carbon Dioxide   19 L 


 


Creatinine   0.5 L 


 


Magnesium    5.50 H


 


Albumin   3.8 L 














  19





  06:06 11:55 19:53


 


Hgb   


 


MCH   


 


RDW   


 


Mono % (Auto)   


 


Sodium   


 


Potassium   


 


Carbon Dioxide   


 


Creatinine   


 


Magnesium  6.00 H  6.50 H  5.90 H


 


Albumin   














  19





  00:57 05:41


 


Hgb  


 


MCH  


 


RDW  


 


Mono % (Auto)  


 


Sodium  


 


Potassium  


 


Carbon Dioxide  


 


Creatinine  


 


Magnesium  6.00 H  6.30 H


 


Albumin  














- Results


US- obstetric: report reviewed

## 2019-12-13 NOTE — PROGRESS NOTE
Subjective





- Subjective


Date of service: 12/13/19


Principal diagnosis: IUP @ 27 6/7 weeks;  Twin gestation; PTL


Interval history: 





assuming care of this patient from Dr Torres


agree with plan of care to date


conservative management


delivery by c/section for maternal/fetal indication


maternal/fetal status reassuring overall


Christina BYNUM


Patient reports: new complaints (yeast infection), fetal movement normal, no 

loss of fluid, no vaginal bleeding, no contractions





Objective





- Vital Signs


Vital Signs: 


                               Vital Signs - 12hr











  12/13/19 12/13/19 12/13/19





  06:56 06:58 06:59


 


Temperature   


 


Pulse Rate 96 H  98 H


 


Respiratory  18 





Rate   


 


Blood Pressure 101/59  99/55


 


Blood Pressure   





[Right]   














  12/13/19





  07:12


 


Temperature 98.1 F


 


Pulse Rate 101 H


 


Respiratory 16





Rate 


 


Blood Pressure 96/52


 


Blood Pressure 96/52





[Right] 














- Labs


Labs: 


                                  Abnormal Labs











  12/05/19 12/05/19 12/06/19





  16:46 16:46 00:51


 


Hgb  10.0 L  


 


MCH  26 L  


 


RDW  16.1 H  


 


Mono % (Auto)  8.5 H  


 


Sodium   135 L 


 


Potassium   3.4 L 


 


Carbon Dioxide   19 L 


 


Creatinine   0.5 L 


 


Magnesium    5.50 H


 


Albumin   3.8 L 














  12/06/19 12/06/19 12/06/19





  06:06 11:55 19:53


 


Hgb   


 


MCH   


 


RDW   


 


Mono % (Auto)   


 


Sodium   


 


Potassium   


 


Carbon Dioxide   


 


Creatinine   


 


Magnesium  6.00 H  6.50 H  5.90 H


 


Albumin   














  12/07/19 12/07/19





  00:57 05:41


 


Hgb  


 


MCH  


 


RDW  


 


Mono % (Auto)  


 


Sodium  


 


Potassium  


 


Carbon Dioxide  


 


Creatinine  


 


Magnesium  6.00 H  6.30 H


 


Albumin

## 2019-12-14 LAB
ALBUMIN SERPL-MCNC: 3.6 G/DL (ref 3.9–5)
ALT SERPL-CCNC: 19 UNITS/L (ref 7–56)
BUN SERPL-MCNC: 6 MG/DL (ref 7–17)
BUN/CREAT SERPL: 12 %
CALCIUM SERPL-MCNC: 8.9 MG/DL (ref 8.4–10.2)
HCT VFR BLD CALC: 28.2 % (ref 30.3–42.9)
HEMOLYSIS INDEX: 19
HGB BLD-MCNC: 9 GM/DL (ref 10.1–14.3)
MCHC RBC AUTO-ENTMCNC: 32 % (ref 30–34)
MCV RBC AUTO: 83 FL (ref 79–97)
PLATELET # BLD: 285 K/MM3 (ref 140–440)
RBC # BLD AUTO: 3.38 M/MM3 (ref 3.65–5.03)

## 2019-12-14 RX ADMIN — NIFEDIPINE SCH MG: 10 CAPSULE ORAL at 18:05

## 2019-12-14 RX ADMIN — NIFEDIPINE SCH MG: 10 CAPSULE ORAL at 07:08

## 2019-12-14 RX ADMIN — NIFEDIPINE SCH MG: 10 CAPSULE ORAL at 06:36

## 2019-12-14 NOTE — PROGRESS NOTE
Subjective





- Subjective


Date of service: 12/14/19


Principal diagnosis: IUP @ 27 6/7 weeks;  Twin gestation; PTL


Interval history: 





Patient doing well


no complaints


FHT reactivex2, appropriate for gestational age


Not in labor


plan for conservative management at this time


Delivery for maternal/fetal indication


LELE Barros MD





Patient reports: new complaints (yeast infection), fetal movement normal, no 

loss of fluid, no vaginal bleeding, no contractions





Objective





- Vital Signs


Vital Signs: 


                               Vital Signs - 12hr











  12/14/19 12/14/19 12/14/19





  03:22 06:21 12:02


 


Temperature 98.4 F  


 


Pulse Rate 109 H 103 H 106 H


 


Respiratory 18  





Rate   


 


Blood Pressure 91/53 102/57 101/58














- Labs


Labs: 


                                  Abnormal Labs











  12/05/19 12/05/19 12/06/19





  16:46 16:46 00:51


 


RBC   


 


Hgb  10.0 L  


 


Hct   


 


MCH  26 L  


 


RDW  16.1 H  


 


Mono % (Auto)  8.5 H  


 


Sodium   135 L 


 


Potassium   3.4 L 


 


Carbon Dioxide   19 L 


 


BUN   


 


Creatinine   0.5 L 


 


Magnesium    5.50 H


 


Albumin   3.8 L 














  12/06/19 12/06/19 12/06/19





  06:06 11:55 19:53


 


RBC   


 


Hgb   


 


Hct   


 


MCH   


 


RDW   


 


Mono % (Auto)   


 


Sodium   


 


Potassium   


 


Carbon Dioxide   


 


BUN   


 


Creatinine   


 


Magnesium  6.00 H  6.50 H  5.90 H


 


Albumin   














  12/07/19 12/07/19 12/14/19





  00:57 05:41 05:52


 


RBC    3.38 L


 


Hgb    9.0 L


 


Hct    28.2 L


 


MCH    27 L


 


RDW    16.0 H


 


Mono % (Auto)   


 


Sodium   


 


Potassium   


 


Carbon Dioxide   


 


BUN   


 


Creatinine   


 


Magnesium  6.00 H  6.30 H 


 


Albumin   














  12/14/19





  05:52


 


RBC 


 


Hgb 


 


Hct 


 


MCH 


 


RDW 


 


Mono % (Auto) 


 


Sodium  136 L


 


Potassium 


 


Carbon Dioxide  20 L


 


BUN  6 L


 


Creatinine  0.5 L


 


Magnesium 


 


Albumin  3.6 L








                         Laboratory Results - last 24 hr











  12/14/19 12/14/19





  05:52 05:52


 


WBC  10.0 


 


RBC  3.38 L 


 


Hgb  9.0 L 


 


Hct  28.2 L 


 


MCV  83 


 


MCH  27 L 


 


MCHC  32 


 


RDW  16.0 H 


 


Plt Count  285 


 


Sodium   136 L


 


Potassium   4.0


 


Chloride   99.5


 


Carbon Dioxide   20 L


 


Anion Gap   21


 


BUN   6 L


 


Creatinine   0.5 L


 


Estimated GFR   > 60


 


BUN/Creatinine Ratio   12


 


Glucose   76


 


Calcium   8.9


 


Total Bilirubin   < 0.20


 


AST   18


 


ALT   19


 


Alkaline Phosphatase   64


 


Total Protein   7.0


 


Albumin   3.6 L


 


Albumin/Globulin Ratio   1.1

## 2019-12-15 LAB
BAND NEUTROPHILS # (MANUAL): 0.4 K/MM3
HCT VFR BLD CALC: 26.2 % (ref 30.3–42.9)
HCT VFR BLD CALC: 27.7 % (ref 30.3–42.9)
HGB BLD-MCNC: 8.4 GM/DL (ref 10.1–14.3)
HGB BLD-MCNC: 8.9 GM/DL (ref 10.1–14.3)
MCHC RBC AUTO-ENTMCNC: 32 % (ref 30–34)
MCV RBC AUTO: 83 FL (ref 79–97)
MYELOCYTES # (MANUAL): 0 K/MM3
PLATELET # BLD: 270 K/MM3 (ref 140–440)
PROMYELOCYTES # (MANUAL): 0 K/MM3
RBC # BLD AUTO: 3.33 M/MM3 (ref 3.65–5.03)
TOTAL CELLS COUNTED BLD: 100

## 2019-12-15 PROCEDURE — 10S07ZZ REPOSITION PRODUCTS OF CONCEPTION, VIA NATURAL OR ARTIFICIAL OPENING: ICD-10-PCS | Performed by: OBSTETRICS & GYNECOLOGY

## 2019-12-15 RX ADMIN — KETOROLAC TROMETHAMINE SCH MG: 30 INJECTION, SOLUTION INTRAMUSCULAR at 21:15

## 2019-12-15 RX ADMIN — SODIUM CHLORIDE, SODIUM LACTATE, POTASSIUM CHLORIDE, AND CALCIUM CHLORIDE SCH MLS/HR: .6; .31; .03; .02 INJECTION, SOLUTION INTRAVENOUS at 06:06

## 2019-12-15 RX ADMIN — SODIUM CHLORIDE, SODIUM LACTATE, POTASSIUM CHLORIDE, AND CALCIUM CHLORIDE SCH MLS/HR: .6; .31; .03; .02 INJECTION, SOLUTION INTRAVENOUS at 05:27

## 2019-12-15 RX ADMIN — NIFEDIPINE SCH MG: 10 CAPSULE ORAL at 02:53

## 2019-12-15 RX ADMIN — KETOROLAC TROMETHAMINE SCH MG: 30 INJECTION, SOLUTION INTRAMUSCULAR at 13:49

## 2019-12-15 NOTE — PROCEDURE NOTE
OB Delivery Note





- Delivery


Date of Delivery: 12/15/19


Surgeon: PALOMA SAMSON


Estimated blood loss: other (600ml)





-  Section


Preop diagnosis: nonreassuring FHR tracing, other (twin IUP, baby A breech 

 labor)


Postop diagnosis: other (probable placenta abruption)


 section procedure: primary low transverse


Disposition: PACU


Complications: other (fetal death of baby B)


Narrative: 





Preoperative diagnosis: IUP at 28+0/7 weeks,  labor, twin gestation Baby 

A breech presentation Baby B transverse back up, Previous c/sectionx1


Postoperative diagnosis: same, probable placental abruption


Procedure: Repeat Low transverse  section via pfannenstiel incision, 

internal version of baby B to breech for delivery


Surgeon: Dr Paloma Samson


Anesthesia: GET


IV Fluids: 1500ml


Urine output 200ml


EBL 600ml


Complications: fetal death of baby B


Drains: Cortes to gravity


Findings: Intact uterus, normal tubes and ovaries bilaterally. Baby A female 

weight 900gms APGAR 3/5/9. Fetal Death of Baby B female weight not available.








I was called at 05:17 for patient with occasional contractions and pain. Due to 

extreme prematurity and desire for conservative management I ordered Stadol 2mg 

IVPx1 dose and IV fluid bolus of NS. At that time it was reported that FHT were 

~150bpm x2 and appropriate and reactive for gestational age. I received a second

call at 05:41 after IV fluids and pain meds given indicating that patient was 

still in pain and that there was a change in baseline of Baby B to 120bpm. I was

in-house and I presented to the room immediately. Upon entering the room I 

checked the cervix and she was complete for a 28 week gestation, baby B 

confirmed breech at bedside with portable US done by myself. I called for an 

urgent C/Section. Informed consent was obtained. 


She was taken to the OR at 06:08. She was placed in the dorsal supine position 

with a leftward tilt. She was prepped and draped in a sterile fashion. NICU was 

in the process of bringing into the room appropriate equipment and once the NICU

team was assembled and a first count was taken-a time out was verified at 06:30.

She underwent GET without complication. The skin incision was made sharply at 

06:31, taken down to the fascia which was incised in the midline and extended 

laterally bluntly. The superior and inferior aspect of the fascial incision was 

taken down bluntly and the abdomen entered sharply with Metzenbaum scissors and 

extended laterally bluntly.The Bladder blade was inserted. The vesicouterine 

incision was made sharply with the Metzenbaum scissors and extended laterally 

sharply. The bladder blade was reinserted. 


The uterine incision was made sharply with the scalpel and extended laterally 

bluntly. Baby A was delivered in breech presentation atraumatically at 06:32. 

The cord was clamped and cut and baby A handed to waiting NICU staff. Baby B was

noted to be transverse back up and via internal uterine version was delivered 

breech presentation at 06:35. Cord was clamped and cut and baby handed to 

waiting NICU staff with no heartbeat at delivery. The placentas delivered 

spontaneously at delivery with abruption suspected. The uterus was cleared of 

all clots and debris. The uterus was exteriorized and a second pass with a clean

wet lap was made to assure the uterus was clear. The uterine incision was suture

ligated in four layers with 0-Vicryl and the vesicouterine peritoneum closed 

with 0-Vicryl in the usual fashion. The peritoneum closed with 3-0 vicryl, the 

abdominal muscles approximated with 3-0 vicryl and the fascia closed with 0-

vicryl The subcuticular structures closed with 3-0 vicryl and the skin closed 

with staples. A pressure dressing was applied. All sponge, needle and instrument

counts correctx2. Mom stable to PACU. Baby A to NICU.  Placenta to Pathology. 

EBL 600ml.


Dr Paloma Samson














- Infant


  ** A


Infant Gender: Female

## 2019-12-15 NOTE — EVENT NOTE
Date: 12/15/19


Called to delivery of a 28 weeker, di-di twin at 0610.  Twin B was noted to have

FHR tracing change in baseline prior to being in the OR. Twin B was delivered in

breech  presentation at 0635.  Baby had no heartbeat, pale, and limp at 

delivery.  Abruption suspected. Baby was dried, stimulated, and placed on 

transwarmer.  Deep suctioned, mayuri-puff/mask PPV 20/7 was immediately started.  

At 1MOL, baby had no respiratory effort and no heartbeat.  At 2MOL, 3.0ETT 

inserted by RT and secured at 7cm.  Remained unchanged.  At 2MOL, I initiated 

chest compressions.  No chest rise, no heart beat, and remained pale and limp.  

Continued to resume chest compression.  At 6MOL, epinephrine was given via ETT 

x1, compression continued/ with neopuff/ETT breaths. 2nd dose of epinephrine was

given via ETT.  I inserted UVC at 11MOL, blood return verified, and line 

flushed.  Continued to provide chest compression with neopuff/ETT breaths 30/9. 

10ml/kg NS bolus given. 3 additional epi. was given via UVC.  No improvement.  

Dr. Delacruz on phone and enroute. Reintubated by RT with 3.0ETT at 24MOL.Slight 

color changed noted on CO2 detector.  No chest rise noted.  Compression 

continued.  Changed to bag/mask in hope of chest rise but no changes noted.  At 

26MOl, a suspected large left pneumothorax  noted with transilumination. At 

30MOL, I needle aspirated with 25 gauge butterfly at 4th intercoastal space in 

the mid axillary line.  Pulled back ~260ml of air.  2 additional epi was given 

via UVC. Chest compression and bag/mask breaths continued throughout, with no 

change. At 39MOL, Dr. Delacruz was at bedside and she reevaluate the baby.  At 

42MOL, Nubia determined all resuscitative efforts was provided.  No respiratory 

efforts, no HR, pale, and limp.  FOB updated updated right outside of OR room by

Dr. Delacruz. Apgars were 0.

## 2019-12-15 NOTE — POST ANESTHESIA EVALUATION
- Post Anesthesia Evaluation


Patient Participated: Yes


Airway Patent: Yes


Stable Respiratory Function: Yes


Nausea/Vomiting: No


Temp > 96.8F: Yes


Pain Manageable: Yes


Adequeate Hydration: Yes


Anesthesia Complications: No


Block Receding Appropriately: Yes

## 2019-12-15 NOTE — PROGRESS NOTE
Subjective





- Subjective


Date of service: 12/15/19


Principal diagnosis: IUP @ 27 6/7 weeks;  Twin gestation; PTL


Interval history: 





Patient in labor


Clarice Q2 minutes


BBOW


baby A breech


plan for urgent operative delivery


infomred consent obtained


Christina BYNUM





Patient reports: new complaints (yeast infection), fetal movement normal, no 

loss of fluid, no vaginal bleeding, no contractions





Objective





- Vital Signs


Vital Signs: 


                               Vital Signs - 12hr











  12/14/19 12/14/19 12/15/19





  21:02 23:57 00:00


 


Temperature  98.4 F 


 


Pulse Rate 109 H 110 H 109 H


 


Respiratory  18 





Rate   


 


Blood Pressure 95/50  


 


Blood Pressure  99/52 





[Left]   


 


O2 Sat by Pulse  100 96





Oximetry   














  12/15/19 12/15/19 12/15/19





  00:01 04:29 04:30


 


Temperature  98.8 F 


 


Pulse Rate 106 H 100 H 96 H


 


Respiratory  18 





Rate   


 


Blood Pressure 99/52  91/53


 


Blood Pressure  91/53 





[Left]   


 


O2 Sat by Pulse  97 





Oximetry   














  12/15/19 12/15/19 12/15/19





  05:29 05:34 05:39


 


Temperature   


 


Pulse Rate 91 H 111 H 120 H


 


Respiratory   





Rate   


 


Blood Pressure   


 


Blood Pressure   





[Left]   


 


O2 Sat by Pulse 98 98 100





Oximetry   














  12/15/19 12/15/19





  05:44 05:49


 


Temperature  


 


Pulse Rate 118 H 116 H


 


Respiratory  





Rate  


 


Blood Pressure  


 


Blood Pressure  





[Left]  


 


O2 Sat by Pulse 100 100





Oximetry  














- Labs


Labs: 


                                  Abnormal Labs











  12/05/19 12/05/19 12/06/19





  16:46 16:46 00:51


 


RBC   


 


Hgb  10.0 L  


 


Hct   


 


MCH  26 L  


 


RDW  16.1 H  


 


Mono % (Auto)  8.5 H  


 


Sodium   135 L 


 


Potassium   3.4 L 


 


Carbon Dioxide   19 L 


 


BUN   


 


Creatinine   0.5 L 


 


Magnesium    5.50 H


 


Albumin   3.8 L 














  12/06/19 12/06/19 12/06/19





  06:06 11:55 19:53


 


RBC   


 


Hgb   


 


Hct   


 


MCH   


 


RDW   


 


Mono % (Auto)   


 


Sodium   


 


Potassium   


 


Carbon Dioxide   


 


BUN   


 


Creatinine   


 


Magnesium  6.00 H  6.50 H  5.90 H


 


Albumin   














  12/07/19 12/07/19 12/14/19





  00:57 05:41 05:52


 


RBC    3.38 L


 


Hgb    9.0 L


 


Hct    28.2 L


 


MCH    27 L


 


RDW    16.0 H


 


Mono % (Auto)   


 


Sodium   


 


Potassium   


 


Carbon Dioxide   


 


BUN   


 


Creatinine   


 


Magnesium  6.00 H  6.30 H 


 


Albumin   














  12/14/19





  05:52


 


RBC 


 


Hgb 


 


Hct 


 


MCH 


 


RDW 


 


Mono % (Auto) 


 


Sodium  136 L


 


Potassium 


 


Carbon Dioxide  20 L


 


BUN  6 L


 


Creatinine  0.5 L


 


Magnesium 


 


Albumin  3.6 L








                         Laboratory Results - last 24 hr











  12/14/19 12/14/19





  05:52 05:52


 


WBC  10.0 


 


RBC  3.38 L 


 


Hgb  9.0 L 


 


Hct  28.2 L 


 


MCV  83 


 


MCH  27 L 


 


MCHC  32 


 


RDW  16.0 H 


 


Plt Count  285 


 


Sodium   136 L


 


Potassium   4.0


 


Chloride   99.5


 


Carbon Dioxide   20 L


 


Anion Gap   21


 


BUN   6 L


 


Creatinine   0.5 L


 


Estimated GFR   > 60


 


BUN/Creatinine Ratio   12


 


Glucose   76


 


Calcium   8.9


 


Total Bilirubin   < 0.20


 


AST   18


 


ALT   19


 


Alkaline Phosphatase   64


 


Total Protein   7.0


 


Albumin   3.6 L


 


Albumin/Globulin Ratio   1.1

## 2019-12-15 NOTE — ANESTHESIA CONSULTATION
Anesthesia Consult and Med Hx


Date of service: 12/15/19





- Airway


Anesthetic Teeth Evaluation: Good


ROM Head & Neck: Adequate


Mental/Hyoid Distance: Adequate


Mallampati Class: Class II


Intubation Access Assessment: Probably Good





- Pulmonary Exam


CTA: Yes





- Cardiac Exam


Cardiac Exam: RRR





- Pre-Operative Health Status


ASA Pre-Surgery Classification: ASA3, Emergency


Proposed Anesthetic Plan: General


Nerve Block: TAP





- Pulmonary


Hx Smoking: No


Hx Asthma: No


Hx Respiratory Symptoms: No


SOB: No


COPD: No


Home Oxygen Therapy: No


Hx Pneumonia: No


Hx Sleep Apnea: No





- Cardiovascular System


Hx Hypertension: No


Hx Coronary Artery Disease: No


Hx Heart Attack/AMI: No


Hx Angina: No


Hx Percutaneous Transluminal Coronary Angioplasty (PTCA): No


Hx Cardia Arrhythmia: No


Hx Pacemaker: No


Hx Internal Defibrillator: No


Hx Valvular Heart Disease: No


Hx Heart Murmur: No


Hx Peripheral Vascular Disease: No





- Central Nervous System


Hx Neuromuscular Disorder: No


Hx Seizures: No


CVA: No


Hx Back Pain: No


Hx Psychiatric Problems: No





- Gastrointestinal


Hx Ulcer: No


Hx Gastroesophageal Reflux Disease: No





- Endocrine


Hx Renal Disease: No


Hx End Stage Renal Disease: No


Hx Cirrhosis: No


Hx Liver Disease: No


Hx Insulin Dependent Diabetes: No


Hx Non-Insulin Dependent Diabetes: No


Hx Thyroid Disease: No


Hx Hypothyroidism: No


Hx Hyperthyroidism: No





- Hematic


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


Hx Sickle Cell Disease: No





- Other Systems


Hx Alcohol Use: No


Hx Substance Use: No


Hx Cancer: No


Hx Obesity: Yes (BMI 35.2)





- Additional Comments


Anesthesia Medical History Comments: Abruption bleeding risk

## 2019-12-16 LAB
BASOPHILS # (AUTO): 0 K/MM3 (ref 0–0.1)
BASOPHILS NFR BLD AUTO: 0.1 % (ref 0–1.8)
EOSINOPHIL # BLD AUTO: 0 K/MM3 (ref 0–0.4)
EOSINOPHIL NFR BLD AUTO: 0.1 % (ref 0–4.3)
HCT VFR BLD CALC: 21.5 % (ref 30.3–42.9)
HCT VFR BLD CALC: 23.2 % (ref 30.3–42.9)
HGB BLD-MCNC: 7 GM/DL (ref 10.1–14.3)
HGB BLD-MCNC: 7.4 GM/DL (ref 10.1–14.3)
LYMPHOCYTES # BLD AUTO: 1.8 K/MM3 (ref 1.2–5.4)
LYMPHOCYTES NFR BLD AUTO: 10.7 % (ref 13.4–35)
MCHC RBC AUTO-ENTMCNC: 32 % (ref 30–34)
MCV RBC AUTO: 83 FL (ref 79–97)
MONOCYTES # (AUTO): 1.4 K/MM3 (ref 0–0.8)
MONOCYTES % (AUTO): 8.3 % (ref 0–7.3)
PLATELET # BLD: 244 K/MM3 (ref 140–440)
RBC # BLD AUTO: 2.6 M/MM3 (ref 3.65–5.03)

## 2019-12-16 RX ADMIN — DOCUSATE SODIUM PRN MG: 100 CAPSULE, LIQUID FILLED ORAL at 18:07

## 2019-12-16 RX ADMIN — KETOROLAC TROMETHAMINE SCH MG: 30 INJECTION, SOLUTION INTRAMUSCULAR at 04:49

## 2019-12-16 RX ADMIN — DOCUSATE SODIUM PRN MG: 100 CAPSULE, LIQUID FILLED ORAL at 04:58

## 2019-12-16 RX ADMIN — OXYCODONE AND ACETAMINOPHEN PRN TAB: 5; 325 TABLET ORAL at 13:10

## 2019-12-16 RX ADMIN — OXYCODONE AND ACETAMINOPHEN PRN TAB: 5; 325 TABLET ORAL at 22:34

## 2019-12-16 RX ADMIN — Medication SCH EACH: at 18:08

## 2019-12-16 RX ADMIN — FERROUS SULFATE TAB 325 MG (65 MG ELEMENTAL FE) SCH MG: 325 (65 FE) TAB at 18:07

## 2019-12-16 RX ADMIN — Medication SCH EACH: at 18:07

## 2019-12-16 RX ADMIN — KETOROLAC TROMETHAMINE SCH: 30 INJECTION, SOLUTION INTRAMUSCULAR at 06:10

## 2019-12-16 NOTE — PROGRESS NOTE
Assessment and Plan


A: Postpartum/postop day 1 S/P low transverse  section. 


Heart murmur. 


Anemia. 


Grief process. 


P: Hospitalist consult. 


Social service/grief counselor consult.


Advance diet as tolerated. 


Iron supplementation. 


Repeat H&H.


Repeat blood pressure. 


Consulted with Dr. Osman re: this patient. 








Subjective





- Subjective


Date of service: 19


Principal diagnosis: Postpartum day 1 S/P primary low transverse  

section


Interval history: 


Postpartum/postop day 1 S/P primary low transverse  section. 


Loss of one of twins; pt. to see /grief counselor. 


Patient is tearful today. Patient is voiding without difficulty, passing gas, 

ambulating well, tolerating liquids without nausea or vomiting. 


Patient denies headache, chest pain, cough, shortness of breath, leg pain, or 

heavy bleeding. 





Patient reports: appetite normal, voiding normally, pain well controlled, 

flatus, ambulating normally, no dizzy ambulation, no nauseated


[place]: in NICU,  (one baby in NICU; other twin )





Objective





- Vital Signs


Latest vital signs: 


                                   Vital Signs











  Temp Pulse Resp BP BP Pulse Ox


 


 19 07:39  98 F  94 H  17  97/63   98


 


 19 05:43  98.6 F  82  18  95/64   97


 


 19 05:19    18   


 


 19 04:49    18   


 


 19 00:34  98.1 F  86  18  100/49   94


 


 12/15/19 21:45    18   


 


 12/15/19 21:15    18   


 


 12/15/19 20:55  99.1 F  88  20  105/58   99


 


 12/15/19 15:53  98.4 F  89  18  105/56   97


 


 12/15/19 11:00  98.3 F  88  16   93/52  97








                                Intake and Output











 12/15/19 12/16/19 12/16/19





 23:59 07:59 15:59


 


Intake Total 360 540 260


 


Output Total 800 350 1


 


Balance -440 190 259


 


Intake:   


 


  Oral 360 240 260


 


  Intake, Free Water  300 


 


Output:   


 


  Urine 800 350 1


 


    Indwelling Catheter 200  1


 


    Void 600 350 


 


Other:   


 


  Total, Intake Amount 360 240 260


 


  Total, Output Amount 200 350 1














- Exam


Cardiovascular: Present: Regular rate, Normal S1, Normal S2, Other (murmur)


Lungs: Present: Clear to auscultation


Abdomen: Present: normal appearance, soft, normal bowel sounds.  Absent: 

distention, tenderness, guarding, rigidity


Uterus: Present: normal, firm, fundal height below umbilicus.  Absent: 

bogginess, tenderness


Extremities: Present: normal.  Absent: tenderness, edema


Incision: Present: normal, dry, intact, dressed





- Labs


Labs: 


                              Abnormal lab results











  12/15/19 12/15/19 12/16/19 Range/Units





  10:23 21:34 05:36 


 


WBC  19.6 H   17.2 H  (4.5-11.0)  K/mm3


 


RBC  3.33 L   2.60 L  (3.65-5.03)  M/mm3


 


Hgb  8.9 L  8.4 L  7.0 L  (10.1-14.3)  gm/dl


 


Hct  27.7 L  26.2 L  21.5 L  (30.3-42.9)  %


 


MCH  27 L   27 L  (28-32)  pg


 


RDW  16.3 H   16.3 H  (13.2-15.2)  %


 


Lymph % (Auto)    10.7 L  (13.4-35.0)  %


 


Mono % (Auto)    8.3 H  (0.0-7.3)  %


 


Mono #    1.4 H  (0.0-0.8)  K/mm3


 


Seg Neutrophils %    80.8 H  (40.0-70.0)  %


 


Seg Neuts % (Manual)  84.0 H    (40.0-70.0)  %


 


Lymphocytes % (Manual)  8.0 L    (13.4-35.0)  %


 


Seg Neutrophils #    13.9 H  (1.8-7.7)  K/mm3


 


Seg Neutrophils # Man  16.5 H    (1.8-7.7)  K/mm3

## 2019-12-16 NOTE — POST ANESTHESIA EVALUATION
- Post Anesthesia Evaluation


Patient Participated: Yes


Airway Patent: Yes


Stable Respiratory Function: Yes


Nausea/Vomiting: No


Temp > 96.8F: Yes


Pain Manageable: Yes


Adequeate Hydration: Yes


Anesthesia Complications: No


Other Comments: Patient denies questions or concerns regarding recent 

anesthetic. Ambulating and voinding without difficulty. Pain well controlled. 

Tolerating diet without nausea.

## 2019-12-17 VITALS — DIASTOLIC BLOOD PRESSURE: 70 MMHG | SYSTOLIC BLOOD PRESSURE: 116 MMHG

## 2019-12-17 RX ADMIN — KETOROLAC TROMETHAMINE SCH: 30 INJECTION, SOLUTION INTRAMUSCULAR at 04:48

## 2019-12-17 RX ADMIN — Medication SCH EACH: at 12:40

## 2019-12-17 RX ADMIN — FERROUS SULFATE TAB 325 MG (65 MG ELEMENTAL FE) SCH MG: 325 (65 FE) TAB at 12:41

## 2019-12-17 NOTE — PROGRESS NOTE
Assessment and Plan





A: POD # 2 - stable


P; Discharge home today


   F/U next week to get her staples out.


   Discharge instructions given





Subjective





- Subjective


Date of service: 19


Principal diagnosis: Postpartum day 2 S/P primary low transverse  

section


Patient reports: appetite normal


[place]: in NICU





Objective





- Vital Signs


Latest vital signs: 


                                   Vital Signs











  Temp Pulse Resp BP BP Pulse Ox


 


 19 07:45  98.1 F  104 H  18   107/66 


 


 19 04:05  98.4 F  66  18   101/74 


 


 19 04:00  98.8 F  71  18   108/81 


 


 19 00:00  98.7 F  74  18   114/72 


 


 19 22:33      116/53 


 


 19 19:30  98.7 F  77  18   104/56 


 


 19 18:53   93 H    107/58 


 


 19 13:10    20   


 


 19 12:30  99.2 F  109 H  17  119/69   100








                                Intake and Output











 19





 22:59 06:59 14:59


 


Intake Total 500 200 480


 


Balance 500 200 480


 


Intake:   


 


  Oral 120 200 480


 


  Intake, Free Water 380  


 


Other:   


 


  Total, Intake Amount 120 200 480


 


  # Voids   


 


    Indwelling Catheter 1  


 


    Void 1 1 














- Exam


Breasts: Present: deferred


Cardiovascular: Present: Regular rate


Lungs: Present: Clear to auscultation


Abdomen: Present: soft


Vulva: both: normal


Uterus: Present: fundal height below umbilicus


Extremities: Present: normal


Deep Tendon Reflex Grade: Normal +2


Incision: Present: intact





- Labs


Labs: 


                              Abnormal lab results











  19 Range/Units





  11:42 


 


Hgb  7.4 L  (10.1-14.3)  gm/dl


 


Hct  23.2 L  (30.3-42.9)  %

## 2019-12-17 NOTE — DISCHARGE SUMMARY
Providers





- Providers


Date of Admission: 


19 14:38





Date of discharge: 19


Attending physician: 


NIKOLAI SMITH





                                        





19 14:09


Consult to Physician [CONS] Routine 


   Comment: 


   Consulting Provider: FELIX BURNETT


   Physician Instructions: 


   Reason For Exam: IUP @ 26 5/7 weeks;  Twin gestation


Consult to Physician [CONS] Routine 


   Comment: 


   Consulting Provider: JAYDEN PINEDA


   Physician Instructions: 


   Reason For Exam: IUP @ 26 5/7 weeks;  Twin gestation;





19 10:26


Consult to Physician [CONS] Urgent 


   Comment: 


   Consulting Provider: REBEKAH MARRUFO


   Physician Instructions: 


   Reason For Exam: heart murmur; postpartum/postop day 1





19 10:32


Consult to Case Management [CONS] Urgent 


   Services Needed at Discharge: 


   Notified:: No


   Additional Physician Instructions: Grief process; fetal loss;  

delivery











Primary care physician: 


NIKOLAI SMITH








Hospitalization


Reason for admission: IUP - ,  labor


Delivery: 


Procedure:  section


Incision: intact


Postpartum complications: none


[place] baby: female


Hospital course: 





Had  for  labor of a 28wk twin gestation with A baby in Breech 

position. Baby B was  upon delivery, probable placental abruption.


Condition at discharge: Good


Disposition: DC-01 TO HOME OR SELFCARE





Plan





- Provider Discharge Summary


Activity: routine, no sex for 6 weeks, no strenuous exercise


Diet: routine


Instructions: routine


Additional instructions: 


[]  Smoking cessation referral if applicable(refer to patient education folder 

for contact #)


[]  Refer to [facility]'s [facility] Booklet








Call your doctor immediately for:


* Fever > 100.5


* Heavy vaginal bleeding ( >1 pad per hour)


* Severe persistent headache


* Shortness of breath


* Reddened, hot, painful area to leg or breast


* Drainage or odor from incision.





* Keep incision clean and dry at all times and follow doctor's instructions 

regarding bathing/showering











- Follow up plan


Follow up: 


NIKOLAI SMITH MD [Primary Care Provider] - 7 Days

## 2020-01-04 ENCOUNTER — HOSPITAL ENCOUNTER (EMERGENCY)
Dept: HOSPITAL 5 - ED | Age: 25
Discharge: HOME | End: 2020-01-04
Payer: MEDICAID

## 2020-01-04 VITALS — DIASTOLIC BLOOD PRESSURE: 70 MMHG | SYSTOLIC BLOOD PRESSURE: 110 MMHG

## 2020-01-04 DIAGNOSIS — R50.9: Primary | ICD-10-CM

## 2020-01-04 DIAGNOSIS — Z98.891: ICD-10-CM

## 2020-01-04 DIAGNOSIS — Z79.899: ICD-10-CM

## 2020-01-04 LAB
ALBUMIN SERPL-MCNC: 4 G/DL (ref 3.9–5)
ALT SERPL-CCNC: 10 UNITS/L (ref 7–56)
BASOPHILS # (AUTO): 0 K/MM3 (ref 0–0.1)
BASOPHILS NFR BLD AUTO: 0.5 % (ref 0–1.8)
BILIRUB UR QL STRIP: (no result)
BLOOD UR QL VISUAL: (no result)
BUN SERPL-MCNC: 9 MG/DL (ref 7–17)
BUN/CREAT SERPL: 11 %
CALCIUM SERPL-MCNC: 9.5 MG/DL (ref 8.4–10.2)
EOSINOPHIL # BLD AUTO: 0 K/MM3 (ref 0–0.4)
EOSINOPHIL NFR BLD AUTO: 0.3 % (ref 0–4.3)
HCT VFR BLD CALC: 35.7 % (ref 30.3–42.9)
HEMOLYSIS INDEX: 30
HGB BLD-MCNC: 11.4 GM/DL (ref 10.1–14.3)
LYMPHOCYTES # BLD AUTO: 0.9 K/MM3 (ref 1.2–5.4)
LYMPHOCYTES NFR BLD AUTO: 16.7 % (ref 13.4–35)
MCHC RBC AUTO-ENTMCNC: 32 % (ref 30–34)
MCV RBC AUTO: 82 FL (ref 79–97)
MONOCYTES # (AUTO): 0.6 K/MM3 (ref 0–0.8)
MONOCYTES % (AUTO): 12.4 % (ref 0–7.3)
PH UR STRIP: 6 [PH] (ref 5–7)
PLATELET # BLD: 442 K/MM3 (ref 140–440)
PROT UR STRIP-MCNC: (no result) MG/DL
RBC # BLD AUTO: 4.37 M/MM3 (ref 3.65–5.03)
RBC #/AREA URNS HPF: 4 /HPF (ref 0–6)
UROBILINOGEN UR-MCNC: < 2 MG/DL (ref ?–2)
WBC #/AREA URNS HPF: 2 /HPF (ref 0–6)

## 2020-01-04 PROCEDURE — 96365 THER/PROPH/DIAG IV INF INIT: CPT

## 2020-01-04 PROCEDURE — 80053 COMPREHEN METABOLIC PANEL: CPT

## 2020-01-04 PROCEDURE — 83735 ASSAY OF MAGNESIUM: CPT

## 2020-01-04 PROCEDURE — 87086 URINE CULTURE/COLONY COUNT: CPT

## 2020-01-04 PROCEDURE — 96375 TX/PRO/DX INJ NEW DRUG ADDON: CPT

## 2020-01-04 PROCEDURE — 82550 ASSAY OF CK (CPK): CPT

## 2020-01-04 PROCEDURE — 82140 ASSAY OF AMMONIA: CPT

## 2020-01-04 PROCEDURE — 36415 COLL VENOUS BLD VENIPUNCTURE: CPT

## 2020-01-04 PROCEDURE — 85025 COMPLETE CBC W/AUTO DIFF WBC: CPT

## 2020-01-04 PROCEDURE — 74177 CT ABD & PELVIS W/CONTRAST: CPT

## 2020-01-04 PROCEDURE — 81001 URINALYSIS AUTO W/SCOPE: CPT

## 2020-01-04 PROCEDURE — 99284 EMERGENCY DEPT VISIT MOD MDM: CPT

## 2020-01-04 PROCEDURE — 87040 BLOOD CULTURE FOR BACTERIA: CPT

## 2020-01-04 NOTE — EVENT NOTE
ED Screening Note


Date of service: 01/04/20


Time: 12:40


ED Screening Note: 





pt complains of c section wound dehiscence x last night


+fever


states purulent drainage from wound 





This initial assessment/diagnostic orders/clinical plan/treatment(s) is/are 

subject to change based on patients health status, clinical progression and re-

assessment by fellow clinical providers in the ED. Further treatment and workup 

at subsequent clinical providers discretion. Patient/guardian urged not to elope

from the ED as their condition may be serious if not clinically assessed and 

managed. 





Initial orders include: 


labs

## 2020-01-04 NOTE — EMERGENCY DEPARTMENT REPORT
ED General Adult HPI





- General


Chief complaint: Abdominal Pain


Stated complaint: FEVER,ABD PAIN, WOUND RE-OPEN


Time Seen by Provider: 20 12:38


Source: patient, RN notes reviewed, old records reviewed


Mode of arrival: Ambulatory


Limitations: No Limitations





- History of Present Illness


Initial comments: 





OB/GYN: Dr. Torres





The patient is a 24-year-old female who is not known to this provider 

previously, admitted to this hospital last month  through , found to 

have twin gestation, requiring , complicated by twin B  upon de

livery, probable placental abruption.





During the history and physical, I am chaperoned by nurse Pretty Rudd





The patient presents to the ER with a complaint of lower abdominal pain, puslike

discharge, fever.  The abdominal pain is crampy and does not radiate anywhere.  

It increases with palpation and decreases with rest.  She is not sure what her 

temperature max is at home.  The patient denies headache, neck pain, chest pain,

urinary symptoms.  The patient has no vomiting.  She denies extremity weakness 

and/or numbness.


-: Gradual, hour(s), days(s)


Location: abdomen


Radiation: non-radiation


Severity scale (0 -10): 8


Quality: aching


Consistency: other


Improves with: other


Worsens with: other


Associated Symptoms: other





- Related Data


                                Home Medications











 Medication  Instructions  Recorded  Confirmed  Last Taken


 


Prenatal Vit-Fe Fumar-FA [Prenatal 1 tab PO Q24H 19





Vitamin]    


 


Progesterone, Micronized 200 mg PO QPM 19 18:00





[Progesterone]    








                                  Previous Rx's











 Medication  Instructions  Recorded  Last Taken  Type


 


Methylergonovine [Methergine] 0.2 mg PO Q8HR #3 tablet 20 Unknown Rx


 


Ondansetron [Zofran Odt] 4 mg PO Q8HR PRN #15 tab.rapdis 20 Unknown Rx


 


metroNIDAZOLE [Flagyl] 500 mg PO Q8HR #15 tablet 20 Unknown Rx


 


oxyCODONE /ACETAMINOPHEN [Percocet 1 tab PO Q6HR PRN #10 tablet 20 Unknown

 Rx





5/325]    











                                    Allergies











Allergy/AdvReac Type Severity Reaction Status Date / Time


 


No Known Allergies Allergy   Verified 19 21:51














ED Review of Systems


ROS: 


Stated complaint: FEVER,ABD PAIN, WOUND RE-OPEN


Other details as noted in HPI





Constitutional: fever


Eyes: denies: eye discharge


ENT: denies: congestion


Cardiovascular: denies: syncope


Gastrointestinal: abdominal pain


Genitourinary: denies: dysuria


Musculoskeletal: denies: back pain


Skin: rash, lesions


Neurological: denies: weakness


Hematological/Lymphatic: denies: easy bleeding





ED Past Medical Hx





- Past Medical History


Hx Hypertension: No


Hx Heart Attack/AMI: No


Hx Congestive Heart Failure: No


Hx Diabetes: No


Hx Deep Vein Thrombosis: No


Hx Liver Disease: No


Hx Renal Disease: No


Hx Sickle Cell Disease: No


Hx Seizures: No


Hx Asthma: No


Hx COPD: No


Hx HIV: No


Additional medical history: Vaginal delivery





- Surgical History


Hx Pacemaker: No


Hx Internal Defibrillator: No


Additional Surgical History: .  





- Social History


Smoking Status: Never Smoker


Substance Use Type: None





- Medications


Home Medications: 


                                Home Medications











 Medication  Instructions  Recorded  Confirmed  Last Taken  Type


 


Prenatal Vit-Fe Fumar-FA [Prenatal 1 tab PO Q24H 19 

History





Vitamin]     


 


Progesterone, Micronized 200 mg PO QPM 19 18:00 History





[Progesterone]     


 


Methylergonovine [Methergine] 0.2 mg PO Q8HR #3 tablet 20  Unknown Rx


 


Ondansetron [Zofran Odt] 4 mg PO Q8HR PRN #15 tab.rapdis 20  Unknown Rx


 


metroNIDAZOLE [Flagyl] 500 mg PO Q8HR #15 tablet 20  Unknown Rx


 


oxyCODONE /ACETAMINOPHEN [Percocet 1 tab PO Q6HR PRN #10 tablet 20  

Unknown Rx





5/325]     














ED Physical Exam





- General


Limitations: No Limitations


General appearance: alert, in no apparent distress





- Head


Head exam: Present: atraumatic, normocephalic





- Eye


Eye exam: Present: normal appearance, EOMI.  Absent: nystagmus





- ENT


ENT exam: Present: normal exam, normal orophraynx, mucous membranes moist, 

normal external ear exam





- Neck


Neck exam: Present: normal inspection, full ROM.  Absent: tenderness, 

meningismus





- Respiratory


Respiratory exam: Present: normal lung sounds bilaterally.  Absent: respiratory 

distress





- Cardiovascular


Cardiovascular Exam: Present: normal rhythm, tachycardia, normal heart sounds.  

Absent: systolic murmur, diastolic murmur, rubs, gallop





- GI/Abdominal


GI/Abdominal exam: Present: soft, tenderness (there is minimal lower abdominal 

tenderness, without rebound, guarding or peritoneal sign.).  Absent: distended, 

guarding, rebound, rigid, pulsatile mass





- Extremities Exam


Extremities exam: Present: normal inspection, full ROM, other (2+ pulses noted 

in the bilateral upper and lower extremities.  The pelvis is stable.  There is 

no long bony tenderness.  The muscular compartments are soft.  There is no 

redness, pus, streaking or erythema.).  Absent: pedal edema, joint swelling, 

calf tenderness





- Back Exam


Back exam: Present: normal inspection.  Absent: tenderness, CVA tenderness (R), 

CVA tenderness (L), paraspinal tenderness, vertebral tenderness





- Neurological Exam


Neurological exam: Present: alert, other (there is no facial droop.  The tongue 

is midline.  Extraocular movements are intact bilaterally.  Speaking in full 

sentences.  Hearing is grossly intact.  5 out of 5 strength bilateral upper and 

lower extremities.  Sensation is intact to light touch bilateral upper and lower

extremities.).  Absent: motor sensory deficit





- Psychiatric


Psychiatric exam: Present: normal affect, normal mood





- Skin


Skin exam: Present: warm, other (at the suprapubic region, there is minimal 

purulent discharge noted from the surgical site, with minimal wound dehiscence.)





ED Course


                                   Vital Signs











  20





  12:39 15:06


 


Temperature 100.6 F H 99.5 F


 


Pulse Rate 106 H 92 H


 


Respiratory 20 20





Rate  


 


Blood Pressure 108/75 


 


Blood Pressure  108/70





[Left]  


 


O2 Sat by Pulse 98 97





Oximetry  














- Reevaluation(s)


Reevaluation #1: 





20 16:22


Differential diagnosis, including but not limited to: Postsurgical wound, 

retained products of conception, urinary tract infection





Assessment and plan: 24-year-old female with fever, tachycardia, minimal lower 

abdominal purulent discharge, suspicious for postoperative infection.  Her 

tachycardia has resolved and her fever has resolved.  She appears quite 

comfortable.  Informed verbal consent is obtained for CT scan with IV contrast. 

 IV fluids and antibiotics ordered.  Pain medication ordered.  Discussed with 

her gynecologist, Dr. Torres, who is agreeable to this plan of care, and requests

 a call back once initial diagnostics have resulted.


Reevaluation #2: 





20 18:21


CT scan is reviewed and appreciated.  Patient reassessed multiple times while 

here in the department.  She is tolerating liquid feeds, and tachycardia has 

resolved.  I have reviewed her operative note from her  12/15/2019.  

Both placentas were completely evacuated.  Therefore, this is very unlikely to 

be retained products of conception.  Dr. Torres also reiterates this opinion.  

The patient has not endorsed any urinary symptoms.  Renal anatomy is likely 

secondary to post-gravid uterus.


The patient is appropriate for trial of oral outpatient therapy.  Dr. Torres 

advises Methergine, 0.2 every 8 hours 3 doses, and metronidazole.





Extensive discussion have outpatient.  She indicates she would like to be 

discharged.  Nursing team to apply dressing to wound, Dr. Torres agrees to follow

 up patient on Monday or Tuesday, which is in 2 or 3 days.











ED Medical Decision Making





- Lab Data


Result diagrams: 


                                 20 13:15





                                 20 13:15








                                   Vital Signs











  20





  12:39 15:06


 


Temperature 100.6 F H 99.5 F


 


Pulse Rate 106 H 92 H


 


Respiratory 20 20





Rate  


 


Blood Pressure 108/75 


 


Blood Pressure  108/70





[Left]  


 


O2 Sat by Pulse 98 97





Oximetry  











                                   Lab Results











  20 Range/Units





  13:15 13:15 13:15 


 


WBC  5.2    (4.5-11.0)  K/mm3


 


RBC  4.37    (3.65-5.03)  M/mm3


 


Hgb  11.4    (10.1-14.3)  gm/dl


 


Hct  35.7    (30.3-42.9)  %


 


MCV  82    (79-97)  fl


 


MCH  26 L    (28-32)  pg


 


MCHC  32    (30-34)  %


 


RDW  16.7 H    (13.2-15.2)  %


 


Plt Count  442 H    (140-440)  K/mm3


 


Lymph % (Auto)  16.7    (13.4-35.0)  %


 


Mono % (Auto)  12.4 H    (0.0-7.3)  %


 


Eos % (Auto)  0.3    (0.0-4.3)  %


 


Baso % (Auto)  0.5    (0.0-1.8)  %


 


Lymph #  0.9 L    (1.2-5.4)  K/mm3


 


Mono #  0.6    (0.0-0.8)  K/mm3


 


Eos #  0.0    (0.0-0.4)  K/mm3


 


Baso #  0.0    (0.0-0.1)  K/mm3


 


Seg Neutrophils %  70.1 H    (40.0-70.0)  %


 


Seg Neutrophils #  3.6    (1.8-7.7)  K/mm3


 


Sodium   137   (137-145)  mmol/L


 


Potassium   4.7   (3.6-5.0)  mmol/L


 


Chloride   99.5   ()  mmol/L


 


Carbon Dioxide   24   (22-30)  mmol/L


 


Anion Gap   18   mmol/L


 


BUN   9   (7-17)  mg/dL


 


Creatinine   0.8   (0.7-1.2)  mg/dL


 


Estimated GFR   > 60   ml/min


 


BUN/Creatinine Ratio   11   %


 


Glucose   83   ()  mg/dL


 


Lactic Acid    0.50 L  (0.7-2.0)  mmol/L


 


Calcium   9.5   (8.4-10.2)  mg/dL


 


Magnesium     (1.7-2.3)  mg/dL


 


Total Bilirubin   0.30   (0.1-1.2)  mg/dL


 


AST   18   (5-40)  units/L


 


ALT   10   (7-56)  units/L


 


Alkaline Phosphatase   59   ()  units/L


 


Total Creatine Kinase     ()  units/L


 


Total Protein   7.6   (6.3-8.2)  g/dL


 


Albumin   4.0   (3.9-5)  g/dL


 


Albumin/Globulin Ratio   1.1   %














  20 Range/Units





  15:25 


 


WBC   (4.5-11.0)  K/mm3


 


RBC   (3.65-5.03)  M/mm3


 


Hgb   (10.1-14.3)  gm/dl


 


Hct   (30.3-42.9)  %


 


MCV   (79-97)  fl


 


MCH   (28-32)  pg


 


MCHC   (30-34)  %


 


RDW   (13.2-15.2)  %


 


Plt Count   (140-440)  K/mm3


 


Lymph % (Auto)   (13.4-35.0)  %


 


Mono % (Auto)   (0.0-7.3)  %


 


Eos % (Auto)   (0.0-4.3)  %


 


Baso % (Auto)   (0.0-1.8)  %


 


Lymph #   (1.2-5.4)  K/mm3


 


Mono #   (0.0-0.8)  K/mm3


 


Eos #   (0.0-0.4)  K/mm3


 


Baso #   (0.0-0.1)  K/mm3


 


Seg Neutrophils %   (40.0-70.0)  %


 


Seg Neutrophils #   (1.8-7.7)  K/mm3


 


Sodium   (137-145)  mmol/L


 


Potassium   (3.6-5.0)  mmol/L


 


Chloride   ()  mmol/L


 


Carbon Dioxide   (22-30)  mmol/L


 


Anion Gap   mmol/L


 


BUN   (7-17)  mg/dL


 


Creatinine   (0.7-1.2)  mg/dL


 


Estimated GFR   ml/min


 


BUN/Creatinine Ratio   %


 


Glucose   ()  mg/dL


 


Lactic Acid   (0.7-2.0)  mmol/L


 


Calcium   (8.4-10.2)  mg/dL


 


Magnesium  2.00  (1.7-2.3)  mg/dL


 


Total Bilirubin   (0.1-1.2)  mg/dL


 


AST   (5-40)  units/L


 


ALT   (7-56)  units/L


 


Alkaline Phosphatase   ()  units/L


 


Total Creatine Kinase  121  ()  units/L


 


Total Protein   (6.3-8.2)  g/dL


 


Albumin   (3.9-5)  g/dL


 


Albumin/Globulin Ratio   %














- Radiology Data


Radiology results: report reviewed, image reviewed





Print Report


Referring Physician:   PILAR KAMARA


Patient Name:   NEERU LIMA


Patient ID:   [id number]


YOB: 1995


Sex:   Female


Accession:   [id number]


Report Date:   2020


Report Status:   Finalized


Findings


[facility]


[address] [place] [address]





Cat Scan Report


Signed





Patient: NEERU LIMA


MR#: [id number]


: 1995 Acct:[id number]





Age/Sex: 24 / F ADM Date: 20





Loc: ED


Attending Dr:








Ordering Physician: PILAR KAMARA MD


Date of Service: 20


Procedure(s): CT abdomen pelvis w con


Accession Number(s): [id number]





cc: PILAR KAMARA MD








CT abdomen pelvis w con





INDICATION: abd pain pus fever s/p c section.





TECHNIQUE:


All CT scans at this location are performed using the following dose modulation 

technique:


Automated exposure control.





CONTRAST: Omnipaque 300, 100 cc IV injection





COMPARISON: None available.





CT ABDOMEN: The parenchymal organs are unremarkable in appearance other than 

mild residual fullness


of the renal collecting systems and ureters. Negative for abdominal mass, fluid 

or inflammation. The


bowel is not dilated or thickened.





Small basilar effusions are symmetric.





CT PELVIS: Large postpartum uterus with residual debris within the endometrial 

cavity. No pelvic


fluid collection or intrauterine air.





Diastases of the rectus muscles with eventration of the anterior abdominal wall.

 A focal fat-


containing umbilical hernia is present.





Moderate bladder distention.





IMPRESSION:


1. Negative for postpartum abscess.


2. Postpartum uterus with internal debris, but no air


3. Small effusions.


4. Diastases of the rectus muscles with eventration of the anterior abdominal 

wall and focal fat-


containing umbilical hernia.


5. Moderate bladder distention with mild distention of the ureters and upper 

collecting systems.





Signer Name: Osmar Luna MD


Signed: 2020 5:10 PM


Workstation Name: [id number]








Transcribed By: ES


Dictated By: Osmar Luna MD


Electronically Authenticated By: Osmar Luna MD


Signed Date/Time: 20 4374


Critical care attestation.: 


If time is entered above; I have spent that time in minutes in the direct care 

of this critically ill patient, excluding procedure time.








ED Disposition


Clinical Impression: 


 Acute febrile illness, History of 





Disposition: DC-01 TO HOME OR SELFCARE


Is pt being admited?: No


Does the pt Need Aspirin: No


Condition: Stable


Additional Instructions: 


Rest, avoid heavy lifting, and avoid strenuous physical activities.  Take the 

Percocet medication as needed for pain, when taking this medication, do not 

drive, consume alcohol, or make important decisions.


Take antibiotics as directed, and Methergine as directed.  Do not breast-feed 

until antibiotics are completed, and patient has allowed for 24 hours after last

 antibiotic dose to reinitiate breast-feeding.





Do not take metformin medication for the next 2 days, if patient takes this 

medication.  Follow-up with Dr. Torres within the next 2-3 days for repeat 

checkup and or evaluation.


Cultures were sent today, and results will be available in the next 3-5 days.  

Please have your primary care doctor or gynecologist contact the medical records

 department to obtain culture results.








Rest, avoid heavy lifting, avoid strenuous physical activity, and avoid sex 

until cleared to do so by her private gynecologist.








When taking Methergine, patient may expect bleeding and/or cramps.  This is a 

typical and normal side effect.





Return to the emergency room right away with projectile vomiting, change in 

mental status, confusion, inability to tolerate liquid feeds, new, worsening or 

different symptoms not present on the initial ER evaluation.





Patient may develop fever, and if she does, she may take Tylenol, 650 mg by 

mouth, every 4-6 hours.  Maximum daily dose of Tylenol is 4 g per 24 hours.








Please note that in Percocet tablets, each tablet contains 325 mg of Tylenol.











Referrals: 


NIKOLAI TORRES MD [Staff Physician] - 3-5 Days

## 2020-01-04 NOTE — CAT SCAN REPORT
CT abdomen pelvis w con



INDICATION:  abd pain pus fever s/p c section.



TECHNIQUE:

All CT scans at this location are performed using the following dose modulation technique: Automated 
exposure control.



CONTRAST:  Omnipaque 300, 100 cc IV injection



COMPARISON: None available.



CT ABDOMEN: The parenchymal organs are unremarkable in appearance other than mild residual fullness o
f the renal collecting systems and ureters. Negative for abdominal mass, fluid or inflammation. The b
owel is not dilated or thickened.



Small basilar effusions are symmetric.



CT PELVIS: Large postpartum uterus with residual debris within the endometrial cavity. No pelvic flui
d collection or intrauterine air.



Diastases of the rectus muscles with eventration of the anterior abdominal wall. A focal fat-containi
ng umbilical hernia is present.



Moderate bladder distention.



IMPRESSION:

1. Negative for postpartum abscess.

2. Postpartum uterus with internal debris, but no air

3. Small effusions.

4. Diastases of the rectus muscles with eventration of the anterior abdominal wall and focal fat-cont
aining umbilical hernia.

5. Moderate bladder distention with mild distention of the ureters and upper collecting systems.



Signer Name: Osmar Luna MD 

Signed: 1/4/2020 5:10 PM

 Workstation Name: [id number]
